# Patient Record
Sex: FEMALE | Race: WHITE | Employment: FULL TIME | URBAN - METROPOLITAN AREA
[De-identification: names, ages, dates, MRNs, and addresses within clinical notes are randomized per-mention and may not be internally consistent; named-entity substitution may affect disease eponyms.]

---

## 2023-06-24 ENCOUNTER — APPOINTMENT (OUTPATIENT)
Dept: CT IMAGING | Age: 48
DRG: 065 | End: 2023-06-24
Payer: OTHER GOVERNMENT

## 2023-06-24 ENCOUNTER — APPOINTMENT (OUTPATIENT)
Dept: MRI IMAGING | Age: 48
DRG: 065 | End: 2023-06-24
Payer: OTHER GOVERNMENT

## 2023-06-24 ENCOUNTER — HOSPITAL ENCOUNTER (INPATIENT)
Age: 48
LOS: 3 days | Discharge: HOME | DRG: 065 | End: 2023-06-27
Attending: EMERGENCY MEDICINE | Admitting: INTERNAL MEDICINE
Payer: OTHER GOVERNMENT

## 2023-06-24 DIAGNOSIS — G45.9 TIA (TRANSIENT ISCHEMIC ATTACK): Primary | ICD-10-CM

## 2023-06-24 DIAGNOSIS — N17.9 AKI (ACUTE KIDNEY INJURY) (HCC): ICD-10-CM

## 2023-06-24 DIAGNOSIS — I63.9 ACUTE CEREBRAL INFARCTION (HCC): ICD-10-CM

## 2023-06-24 DIAGNOSIS — I10 UNCONTROLLED HYPERTENSION: ICD-10-CM

## 2023-06-24 PROBLEM — R20.0 LEFT SIDED NUMBNESS: Status: ACTIVE | Noted: 2023-06-24

## 2023-06-24 LAB
ALBUMIN SERPL-MCNC: 4.1 G/DL (ref 3.5–5.2)
ALP SERPL-CCNC: 91 U/L (ref 35–104)
ALT SERPL-CCNC: 12 U/L (ref 5–33)
AMPHET UR QL SCN: NEGATIVE
ANION GAP SERPL CALCULATED.3IONS-SCNC: 12 MMOL/L (ref 9–17)
AST SERPL-CCNC: 17 U/L
BARBITURATES UR QL SCN: NEGATIVE
BASOPHILS # BLD: 0.04 K/UL (ref 0–0.2)
BASOPHILS NFR BLD: 1 % (ref 0–2)
BENZODIAZ UR QL: NEGATIVE
BILIRUB SERPL-MCNC: 0.4 MG/DL (ref 0.3–1.2)
BUN SERPL-MCNC: 14 MG/DL (ref 6–20)
CALCIUM SERPL-MCNC: 9.1 MG/DL (ref 8.6–10.4)
CANNABINOIDS UR QL SCN: NEGATIVE
CHLORIDE SERPL-SCNC: 106 MMOL/L (ref 98–107)
CHOLEST SERPL-MCNC: 191 MG/DL
CHOLESTEROL/HDL RATIO: 5.6
CO2 SERPL-SCNC: 23 MMOL/L (ref 20–31)
COCAINE UR QL SCN: NEGATIVE
CREAT SERPL-MCNC: 0.78 MG/DL (ref 0.5–0.9)
EOSINOPHIL # BLD: 0.26 K/UL (ref 0–0.4)
EOSINOPHILS RELATIVE PERCENT: 6 % (ref 0–4)
ERYTHROCYTE [DISTWIDTH] IN BLOOD BY AUTOMATED COUNT: 17.2 % (ref 11.5–14.9)
ERYTHROCYTE [SEDIMENTATION RATE] IN BLOOD BY WESTERGREN METHOD: 10 MM/HR (ref 0–20)
FENTANYL UR QL: NEGATIVE
FERRITIN SERPL-MCNC: 5 NG/ML (ref 13–150)
GFR SERPL CREATININE-BSD FRML MDRD: >60 ML/MIN/1.73M2
GLUCOSE SERPL-MCNC: 114 MG/DL (ref 70–99)
HCG SERPL QL: NEGATIVE
HCT VFR BLD AUTO: 30.6 % (ref 36–46)
HCYS SERPL-SCNC: 18.6 UMOL/L
HDLC SERPL-MCNC: 34 MG/DL
HGB BLD-MCNC: 9 G/DL (ref 12–16)
IRON SATN MFR SERPL: 5 % (ref 20–55)
IRON SERPL-MCNC: 20 UG/DL (ref 37–145)
LDLC SERPL CALC-MCNC: 114 MG/DL (ref 0–130)
LYMPHOCYTES # BLD: 26 % (ref 24–44)
LYMPHOCYTES NFR BLD: 1.14 K/UL (ref 1–4.8)
MAGNESIUM SERPL-MCNC: 2.2 MG/DL (ref 1.6–2.6)
MCH RBC QN AUTO: 18 PG (ref 26–34)
MCHC RBC AUTO-ENTMCNC: 29.5 G/DL (ref 31–37)
MCV RBC AUTO: 60.9 FL (ref 80–100)
METHADONE UR QL: NEGATIVE
MONOCYTES NFR BLD: 0.22 K/UL (ref 0.1–1.3)
MONOCYTES NFR BLD: 5 % (ref 1–7)
MORPHOLOGY: ABNORMAL
NEUTROPHILS NFR BLD: 62 % (ref 36–66)
NEUTS SEG NFR BLD: 2.74 K/UL (ref 1.3–9.1)
OPIATES UR QL SCN: NEGATIVE
OXYCODONE UR QL SCN: NEGATIVE
PCP UR QL SCN: NEGATIVE
PLATELET # BLD AUTO: 320 K/UL (ref 150–450)
PMV BLD AUTO: 6.4 FL (ref 6–12)
POTASSIUM SERPL-SCNC: 3.8 MMOL/L (ref 3.7–5.3)
PROT SERPL-MCNC: 7.1 G/DL (ref 6.4–8.3)
RBC # BLD AUTO: 5.03 M/UL (ref 4–5.2)
RETICS # AUTO: 0.06 M/UL (ref 0.02–0.1)
RETICS/RBC NFR AUTO: 1.3 % (ref 0.5–2)
SODIUM SERPL-SCNC: 141 MMOL/L (ref 135–144)
T4 FREE SERPL-MCNC: 0.6 NG/DL (ref 0.9–1.7)
TEST INFORMATION: NORMAL
TIBC SERPL-MCNC: 383 UG/DL (ref 250–450)
TRIGL SERPL-MCNC: 215 MG/DL
TROPONIN I SERPL HS-MCNC: 8 NG/L (ref 0–14)
TSH SERPL DL<=0.05 MIU/L-ACNC: 29.21 UIU/ML (ref 0.3–5)
UNSATURATED IRON BINDING CAPACITY: 363 UG/DL (ref 112–347)
WBC OTHER # BLD: 4.4 K/UL (ref 3.5–11)

## 2023-06-24 PROCEDURE — 83090 ASSAY OF HOMOCYSTEINE: CPT

## 2023-06-24 PROCEDURE — 83036 HEMOGLOBIN GLYCOSYLATED A1C: CPT

## 2023-06-24 PROCEDURE — 6370000000 HC RX 637 (ALT 250 FOR IP)

## 2023-06-24 PROCEDURE — 83550 IRON BINDING TEST: CPT

## 2023-06-24 PROCEDURE — 86225 DNA ANTIBODY NATIVE: CPT

## 2023-06-24 PROCEDURE — 99223 1ST HOSP IP/OBS HIGH 75: CPT | Performed by: PSYCHIATRY & NEUROLOGY

## 2023-06-24 PROCEDURE — 84703 CHORIONIC GONADOTROPIN ASSAY: CPT

## 2023-06-24 PROCEDURE — 36415 COLL VENOUS BLD VENIPUNCTURE: CPT

## 2023-06-24 PROCEDURE — 70498 CT ANGIOGRAPHY NECK: CPT

## 2023-06-24 PROCEDURE — 99223 1ST HOSP IP/OBS HIGH 75: CPT | Performed by: INTERNAL MEDICINE

## 2023-06-24 PROCEDURE — 96374 THER/PROPH/DIAG INJ IV PUSH: CPT

## 2023-06-24 PROCEDURE — 70551 MRI BRAIN STEM W/O DYE: CPT

## 2023-06-24 PROCEDURE — 6370000000 HC RX 637 (ALT 250 FOR IP): Performed by: NURSE PRACTITIONER

## 2023-06-24 PROCEDURE — 85045 AUTOMATED RETICULOCYTE COUNT: CPT

## 2023-06-24 PROCEDURE — 80061 LIPID PANEL: CPT

## 2023-06-24 PROCEDURE — 82728 ASSAY OF FERRITIN: CPT

## 2023-06-24 PROCEDURE — 85610 PROTHROMBIN TIME: CPT

## 2023-06-24 PROCEDURE — 85027 COMPLETE CBC AUTOMATED: CPT

## 2023-06-24 PROCEDURE — 85613 RUSSELL VIPER VENOM DILUTED: CPT

## 2023-06-24 PROCEDURE — 99285 EMERGENCY DEPT VISIT HI MDM: CPT

## 2023-06-24 PROCEDURE — 83735 ASSAY OF MAGNESIUM: CPT

## 2023-06-24 PROCEDURE — 70450 CT HEAD/BRAIN W/O DYE: CPT

## 2023-06-24 PROCEDURE — 6370000000 HC RX 637 (ALT 250 FOR IP): Performed by: EMERGENCY MEDICINE

## 2023-06-24 PROCEDURE — 86147 CARDIOLIPIN ANTIBODY EA IG: CPT

## 2023-06-24 PROCEDURE — 85652 RBC SED RATE AUTOMATED: CPT

## 2023-06-24 PROCEDURE — 6360000002 HC RX W HCPCS

## 2023-06-24 PROCEDURE — 92610 EVALUATE SWALLOWING FUNCTION: CPT

## 2023-06-24 PROCEDURE — 93005 ELECTROCARDIOGRAM TRACING: CPT | Performed by: EMERGENCY MEDICINE

## 2023-06-24 PROCEDURE — 2580000003 HC RX 258

## 2023-06-24 PROCEDURE — 6360000004 HC RX CONTRAST MEDICATION: Performed by: EMERGENCY MEDICINE

## 2023-06-24 PROCEDURE — 83540 ASSAY OF IRON: CPT

## 2023-06-24 PROCEDURE — 84484 ASSAY OF TROPONIN QUANT: CPT

## 2023-06-24 PROCEDURE — 80053 COMPREHEN METABOLIC PANEL: CPT

## 2023-06-24 PROCEDURE — 85730 THROMBOPLASTIN TIME PARTIAL: CPT

## 2023-06-24 PROCEDURE — 84439 ASSAY OF FREE THYROXINE: CPT

## 2023-06-24 PROCEDURE — 80307 DRUG TEST PRSMV CHEM ANLYZR: CPT

## 2023-06-24 PROCEDURE — 2580000003 HC RX 258: Performed by: EMERGENCY MEDICINE

## 2023-06-24 PROCEDURE — 6360000002 HC RX W HCPCS: Performed by: EMERGENCY MEDICINE

## 2023-06-24 PROCEDURE — 6370000000 HC RX 637 (ALT 250 FOR IP): Performed by: PSYCHIATRY & NEUROLOGY

## 2023-06-24 PROCEDURE — 84443 ASSAY THYROID STIM HORMONE: CPT

## 2023-06-24 PROCEDURE — 86038 ANTINUCLEAR ANTIBODIES: CPT

## 2023-06-24 PROCEDURE — 2060000000 HC ICU INTERMEDIATE R&B

## 2023-06-24 RX ORDER — SODIUM CHLORIDE 0.9 % (FLUSH) 0.9 %
5-40 SYRINGE (ML) INJECTION EVERY 12 HOURS SCHEDULED
Status: DISCONTINUED | OUTPATIENT
Start: 2023-06-24 | End: 2023-06-27 | Stop reason: HOSPADM

## 2023-06-24 RX ORDER — ONDANSETRON 2 MG/ML
8 INJECTION INTRAMUSCULAR; INTRAVENOUS ONCE
Status: COMPLETED | OUTPATIENT
Start: 2023-06-24 | End: 2023-06-24

## 2023-06-24 RX ORDER — BUTALBITAL, ACETAMINOPHEN AND CAFFEINE 300; 40; 50 MG/1; MG/1; MG/1
1 CAPSULE ORAL EVERY 6 HOURS PRN
Status: DISCONTINUED | OUTPATIENT
Start: 2023-06-24 | End: 2023-06-27 | Stop reason: HOSPADM

## 2023-06-24 RX ORDER — ATORVASTATIN CALCIUM 40 MG/1
40 TABLET, FILM COATED ORAL NIGHTLY
Status: DISCONTINUED | OUTPATIENT
Start: 2023-06-24 | End: 2023-06-24

## 2023-06-24 RX ORDER — ATORVASTATIN CALCIUM 80 MG/1
80 TABLET, FILM COATED ORAL NIGHTLY
Status: DISCONTINUED | OUTPATIENT
Start: 2023-06-24 | End: 2023-06-27 | Stop reason: HOSPADM

## 2023-06-24 RX ORDER — POLYETHYLENE GLYCOL 3350 17 G/17G
17 POWDER, FOR SOLUTION ORAL DAILY PRN
Status: DISCONTINUED | OUTPATIENT
Start: 2023-06-24 | End: 2023-06-27 | Stop reason: HOSPADM

## 2023-06-24 RX ORDER — 0.9 % SODIUM CHLORIDE 0.9 %
80 INTRAVENOUS SOLUTION INTRAVENOUS ONCE
Status: COMPLETED | OUTPATIENT
Start: 2023-06-24 | End: 2023-06-24

## 2023-06-24 RX ORDER — LANOLIN ALCOHOL/MO/W.PET/CERES
3 CREAM (GRAM) TOPICAL NIGHTLY PRN
Status: DISCONTINUED | OUTPATIENT
Start: 2023-06-24 | End: 2023-06-27 | Stop reason: HOSPADM

## 2023-06-24 RX ORDER — LORAZEPAM 0.5 MG/1
0.5 TABLET ORAL
Status: COMPLETED | OUTPATIENT
Start: 2023-06-24 | End: 2023-06-24

## 2023-06-24 RX ORDER — IBUPROFEN 600 MG/1
600 TABLET ORAL ONCE
Status: COMPLETED | OUTPATIENT
Start: 2023-06-24 | End: 2023-06-24

## 2023-06-24 RX ORDER — SODIUM CHLORIDE 0.9 % (FLUSH) 0.9 %
5-40 SYRINGE (ML) INJECTION PRN
Status: DISCONTINUED | OUTPATIENT
Start: 2023-06-24 | End: 2023-06-27 | Stop reason: HOSPADM

## 2023-06-24 RX ORDER — ASPIRIN 325 MG
325 TABLET ORAL ONCE
Status: COMPLETED | OUTPATIENT
Start: 2023-06-24 | End: 2023-06-24

## 2023-06-24 RX ORDER — ONDANSETRON 4 MG/1
4 TABLET, ORALLY DISINTEGRATING ORAL EVERY 8 HOURS PRN
Status: DISCONTINUED | OUTPATIENT
Start: 2023-06-24 | End: 2023-06-27 | Stop reason: HOSPADM

## 2023-06-24 RX ORDER — ONDANSETRON 2 MG/ML
4 INJECTION INTRAMUSCULAR; INTRAVENOUS EVERY 6 HOURS PRN
Status: DISCONTINUED | OUTPATIENT
Start: 2023-06-24 | End: 2023-06-27 | Stop reason: HOSPADM

## 2023-06-24 RX ORDER — ACETAMINOPHEN 650 MG/1
650 SUPPOSITORY RECTAL EVERY 6 HOURS PRN
Status: DISCONTINUED | OUTPATIENT
Start: 2023-06-24 | End: 2023-06-27 | Stop reason: HOSPADM

## 2023-06-24 RX ORDER — LISINOPRIL 10 MG/1
10 TABLET ORAL DAILY
Status: DISCONTINUED | OUTPATIENT
Start: 2023-06-24 | End: 2023-06-27 | Stop reason: HOSPADM

## 2023-06-24 RX ORDER — ASPIRIN 81 MG/1
81 TABLET, CHEWABLE ORAL DAILY
Status: DISCONTINUED | OUTPATIENT
Start: 2023-06-25 | End: 2023-06-27 | Stop reason: HOSPADM

## 2023-06-24 RX ORDER — SODIUM CHLORIDE 0.9 % (FLUSH) 0.9 %
10 SYRINGE (ML) INJECTION PRN
Status: DISCONTINUED | OUTPATIENT
Start: 2023-06-24 | End: 2023-06-27 | Stop reason: HOSPADM

## 2023-06-24 RX ORDER — SODIUM CHLORIDE 9 MG/ML
INJECTION, SOLUTION INTRAVENOUS PRN
Status: DISCONTINUED | OUTPATIENT
Start: 2023-06-24 | End: 2023-06-27 | Stop reason: HOSPADM

## 2023-06-24 RX ORDER — ACETAMINOPHEN 325 MG/1
650 TABLET ORAL EVERY 6 HOURS PRN
Status: DISCONTINUED | OUTPATIENT
Start: 2023-06-24 | End: 2023-06-27 | Stop reason: HOSPADM

## 2023-06-24 RX ORDER — ENOXAPARIN SODIUM 100 MG/ML
40 INJECTION SUBCUTANEOUS DAILY
Status: DISCONTINUED | OUTPATIENT
Start: 2023-06-24 | End: 2023-06-27 | Stop reason: HOSPADM

## 2023-06-24 RX ORDER — LISINOPRIL 10 MG/1
TABLET ORAL DAILY
COMMUNITY

## 2023-06-24 RX ORDER — CLOPIDOGREL BISULFATE 75 MG/1
75 TABLET ORAL DAILY
Status: DISCONTINUED | OUTPATIENT
Start: 2023-06-25 | End: 2023-06-24

## 2023-06-24 RX ORDER — TOPIRAMATE 25 MG/1
25 TABLET ORAL
Status: DISCONTINUED | OUTPATIENT
Start: 2023-06-24 | End: 2023-06-27 | Stop reason: HOSPADM

## 2023-06-24 RX ADMIN — LISINOPRIL 10 MG: 10 TABLET ORAL at 10:34

## 2023-06-24 RX ADMIN — IBUPROFEN 600 MG: 600 TABLET, FILM COATED ORAL at 16:41

## 2023-06-24 RX ADMIN — TOPIRAMATE 25 MG: 25 TABLET, FILM COATED ORAL at 20:22

## 2023-06-24 RX ADMIN — IOPAMIDOL 75 ML: 755 INJECTION, SOLUTION INTRAVENOUS at 08:59

## 2023-06-24 RX ADMIN — Medication 3 MG: at 21:49

## 2023-06-24 RX ADMIN — ENOXAPARIN SODIUM 40 MG: 100 INJECTION SUBCUTANEOUS at 10:34

## 2023-06-24 RX ADMIN — SODIUM CHLORIDE, PRESERVATIVE FREE 10 ML: 5 INJECTION INTRAVENOUS at 09:04

## 2023-06-24 RX ADMIN — SODIUM CHLORIDE, PRESERVATIVE FREE 10 ML: 5 INJECTION INTRAVENOUS at 10:46

## 2023-06-24 RX ADMIN — ONDANSETRON 8 MG: 2 INJECTION INTRAMUSCULAR; INTRAVENOUS at 08:36

## 2023-06-24 RX ADMIN — BUTALBITA,ACETAMINOPHEN AND CAFFEINE 1 CAPSULE: 50; 300; 40 CAPSULE ORAL at 19:09

## 2023-06-24 RX ADMIN — ATORVASTATIN CALCIUM 80 MG: 80 TABLET, FILM COATED ORAL at 20:22

## 2023-06-24 RX ADMIN — SODIUM CHLORIDE, PRESERVATIVE FREE 10 ML: 5 INJECTION INTRAVENOUS at 20:22

## 2023-06-24 RX ADMIN — ONDANSETRON 4 MG: 2 INJECTION INTRAMUSCULAR; INTRAVENOUS at 16:42

## 2023-06-24 RX ADMIN — ACETAMINOPHEN 650 MG: 325 TABLET ORAL at 11:47

## 2023-06-24 RX ADMIN — LORAZEPAM 0.5 MG: 0.5 TABLET ORAL at 13:32

## 2023-06-24 RX ADMIN — SODIUM CHLORIDE 80 ML: 9 INJECTION, SOLUTION INTRAVENOUS at 09:04

## 2023-06-24 RX ADMIN — ASPIRIN 325 MG: 325 TABLET ORAL at 08:36

## 2023-06-24 ASSESSMENT — PAIN DESCRIPTION - LOCATION
LOCATION: ARM;CHEST
LOCATION: HEAD
LOCATION: HEAD

## 2023-06-24 ASSESSMENT — ENCOUNTER SYMPTOMS
BACK PAIN: 0
COUGH: 0
SHORTNESS OF BREATH: 0
CHEST TIGHTNESS: 1
ABDOMINAL PAIN: 0

## 2023-06-24 ASSESSMENT — PAIN DESCRIPTION - PAIN TYPE: TYPE: ACUTE PAIN

## 2023-06-24 ASSESSMENT — PAIN DESCRIPTION - DESCRIPTORS: DESCRIPTORS: THROBBING

## 2023-06-24 ASSESSMENT — PAIN SCALES - GENERAL
PAINLEVEL_OUTOF10: 10
PAINLEVEL_OUTOF10: 10
PAINLEVEL_OUTOF10: 7
PAINLEVEL_OUTOF10: 10

## 2023-06-24 ASSESSMENT — PAIN DESCRIPTION - ORIENTATION
ORIENTATION: LEFT
ORIENTATION: ANTERIOR

## 2023-06-24 ASSESSMENT — LIFESTYLE VARIABLES
HOW MANY STANDARD DRINKS CONTAINING ALCOHOL DO YOU HAVE ON A TYPICAL DAY: PATIENT DOES NOT DRINK
HOW OFTEN DO YOU HAVE A DRINK CONTAINING ALCOHOL: NEVER

## 2023-06-24 ASSESSMENT — PAIN - FUNCTIONAL ASSESSMENT
PAIN_FUNCTIONAL_ASSESSMENT: ACTIVITIES ARE NOT PREVENTED
PAIN_FUNCTIONAL_ASSESSMENT: 0-10

## 2023-06-25 LAB
ANION GAP SERPL CALCULATED.3IONS-SCNC: 10 MMOL/L (ref 9–17)
BASOPHILS # BLD: 0.05 K/UL (ref 0–0.2)
BASOPHILS NFR BLD: 1 % (ref 0–2)
BUN SERPL-MCNC: 15 MG/DL (ref 6–20)
CALCIUM SERPL-MCNC: 8.7 MG/DL (ref 8.6–10.4)
CHLORIDE SERPL-SCNC: 105 MMOL/L (ref 98–107)
CO2 SERPL-SCNC: 23 MMOL/L (ref 20–31)
CREAT SERPL-MCNC: 0.98 MG/DL (ref 0.5–0.9)
EOSINOPHIL # BLD: 0.26 K/UL (ref 0–0.4)
EOSINOPHILS RELATIVE PERCENT: 5 % (ref 0–4)
ERYTHROCYTE [DISTWIDTH] IN BLOOD BY AUTOMATED COUNT: 17.2 % (ref 11.5–14.9)
EST. AVERAGE GLUCOSE BLD GHB EST-MCNC: 117 MG/DL
FOLATE SERPL-MCNC: 4.7 NG/ML
GFR SERPL CREATININE-BSD FRML MDRD: >60 ML/MIN/1.73M2
GLUCOSE SERPL-MCNC: 100 MG/DL (ref 70–99)
HBA1C MFR BLD: 5.7 % (ref 4–6)
HCT VFR BLD AUTO: 29.7 % (ref 36–46)
HGB BLD-MCNC: 8.9 G/DL (ref 12–16)
LYMPHOCYTES # BLD: 28 % (ref 24–44)
LYMPHOCYTES NFR BLD: 1.46 K/UL (ref 1–4.8)
MAGNESIUM SERPL-MCNC: 2 MG/DL (ref 1.6–2.6)
MCH RBC QN AUTO: 19 PG (ref 26–34)
MCHC RBC AUTO-ENTMCNC: 30 G/DL (ref 31–37)
MCV RBC AUTO: 63.3 FL (ref 80–100)
MONOCYTES NFR BLD: 0.26 K/UL (ref 0.1–1.3)
MONOCYTES NFR BLD: 5 % (ref 1–7)
MORPHOLOGY: ABNORMAL
NEUTROPHILS NFR BLD: 61 % (ref 36–66)
NEUTS SEG NFR BLD: 3.17 K/UL (ref 1.3–9.1)
PLATELET # BLD AUTO: 296 K/UL (ref 150–450)
PMV BLD AUTO: 6.3 FL (ref 6–12)
POTASSIUM SERPL-SCNC: 3.4 MMOL/L (ref 3.7–5.3)
RBC # BLD AUTO: 4.69 M/UL (ref 4–5.2)
SODIUM SERPL-SCNC: 138 MMOL/L (ref 135–144)
VIT B12 SERPL-MCNC: 538 PG/ML (ref 232–1245)
WBC OTHER # BLD: 5.2 K/UL (ref 3.5–11)

## 2023-06-25 PROCEDURE — 97161 PT EVAL LOW COMPLEX 20 MIN: CPT

## 2023-06-25 PROCEDURE — 36415 COLL VENOUS BLD VENIPUNCTURE: CPT

## 2023-06-25 PROCEDURE — 99233 SBSQ HOSP IP/OBS HIGH 50: CPT | Performed by: INTERNAL MEDICINE

## 2023-06-25 PROCEDURE — 83735 ASSAY OF MAGNESIUM: CPT

## 2023-06-25 PROCEDURE — 93005 ELECTROCARDIOGRAM TRACING: CPT | Performed by: INTERNAL MEDICINE

## 2023-06-25 PROCEDURE — 6370000000 HC RX 637 (ALT 250 FOR IP)

## 2023-06-25 PROCEDURE — 97112 NEUROMUSCULAR REEDUCATION: CPT

## 2023-06-25 PROCEDURE — 6370000000 HC RX 637 (ALT 250 FOR IP): Performed by: PSYCHIATRY & NEUROLOGY

## 2023-06-25 PROCEDURE — 85027 COMPLETE CBC AUTOMATED: CPT

## 2023-06-25 PROCEDURE — 97166 OT EVAL MOD COMPLEX 45 MIN: CPT

## 2023-06-25 PROCEDURE — 6370000000 HC RX 637 (ALT 250 FOR IP): Performed by: NURSE PRACTITIONER

## 2023-06-25 PROCEDURE — 2580000003 HC RX 258

## 2023-06-25 PROCEDURE — 82746 ASSAY OF FOLIC ACID SERUM: CPT

## 2023-06-25 PROCEDURE — 82607 VITAMIN B-12: CPT

## 2023-06-25 PROCEDURE — 99232 SBSQ HOSP IP/OBS MODERATE 35: CPT | Performed by: PSYCHIATRY & NEUROLOGY

## 2023-06-25 PROCEDURE — 80048 BASIC METABOLIC PNL TOTAL CA: CPT

## 2023-06-25 PROCEDURE — 83921 ORGANIC ACID SINGLE QUANT: CPT

## 2023-06-25 PROCEDURE — 6360000002 HC RX W HCPCS

## 2023-06-25 PROCEDURE — 2060000000 HC ICU INTERMEDIATE R&B

## 2023-06-25 PROCEDURE — 99254 IP/OBS CNSLTJ NEW/EST MOD 60: CPT | Performed by: INTERNAL MEDICINE

## 2023-06-25 RX ORDER — POTASSIUM CHLORIDE 7.45 MG/ML
10 INJECTION INTRAVENOUS PRN
Status: DISCONTINUED | OUTPATIENT
Start: 2023-06-25 | End: 2023-06-27 | Stop reason: HOSPADM

## 2023-06-25 RX ORDER — LEVOTHYROXINE SODIUM 0.05 MG/1
50 TABLET ORAL DAILY
Status: DISCONTINUED | OUTPATIENT
Start: 2023-06-25 | End: 2023-06-27 | Stop reason: HOSPADM

## 2023-06-25 RX ORDER — FOLIC ACID 1 MG/1
1 TABLET ORAL 2 TIMES DAILY
Status: DISCONTINUED | OUTPATIENT
Start: 2023-06-25 | End: 2023-06-27 | Stop reason: HOSPADM

## 2023-06-25 RX ORDER — POTASSIUM CHLORIDE 20 MEQ/1
40 TABLET, EXTENDED RELEASE ORAL PRN
Status: DISCONTINUED | OUTPATIENT
Start: 2023-06-25 | End: 2023-06-27 | Stop reason: HOSPADM

## 2023-06-25 RX ADMIN — TOPIRAMATE 25 MG: 25 TABLET, FILM COATED ORAL at 20:53

## 2023-06-25 RX ADMIN — ASPIRIN 81 MG: 81 TABLET, CHEWABLE ORAL at 09:59

## 2023-06-25 RX ADMIN — ENOXAPARIN SODIUM 40 MG: 100 INJECTION SUBCUTANEOUS at 09:59

## 2023-06-25 RX ADMIN — LEVOTHYROXINE SODIUM 50 MCG: 0.05 TABLET ORAL at 10:10

## 2023-06-25 RX ADMIN — POTASSIUM BICARBONATE 40 MEQ: 782 TABLET, EFFERVESCENT ORAL at 15:29

## 2023-06-25 RX ADMIN — ATORVASTATIN CALCIUM 80 MG: 80 TABLET, FILM COATED ORAL at 20:53

## 2023-06-25 RX ADMIN — SODIUM CHLORIDE, PRESERVATIVE FREE 10 ML: 5 INJECTION INTRAVENOUS at 20:53

## 2023-06-25 RX ADMIN — Medication 3 MG: at 20:53

## 2023-06-25 RX ADMIN — SODIUM CHLORIDE, PRESERVATIVE FREE 10 ML: 5 INJECTION INTRAVENOUS at 10:03

## 2023-06-25 RX ADMIN — POLYETHYLENE GLYCOL 3350 17 G: 17 POWDER, FOR SOLUTION ORAL at 15:29

## 2023-06-25 RX ADMIN — BUTALBITA,ACETAMINOPHEN AND CAFFEINE 1 CAPSULE: 50; 300; 40 CAPSULE ORAL at 02:09

## 2023-06-25 RX ADMIN — FOLIC ACID 1 MG: 1 TABLET ORAL at 20:53

## 2023-06-25 ASSESSMENT — ENCOUNTER SYMPTOMS
SORE THROAT: 0
NAUSEA: 0
SHORTNESS OF BREATH: 0
RHINORRHEA: 0
COUGH: 0
VOMITING: 0
ABDOMINAL PAIN: 0

## 2023-06-25 ASSESSMENT — PAIN SCALES - GENERAL: PAINLEVEL_OUTOF10: 7

## 2023-06-26 ENCOUNTER — HOSPITAL ENCOUNTER (OUTPATIENT)
Dept: CARDIAC CATH/INVASIVE PROCEDURES | Age: 48
Discharge: HOME | End: 2023-06-26
Payer: OTHER GOVERNMENT

## 2023-06-26 VITALS
SYSTOLIC BLOOD PRESSURE: 148 MMHG | TEMPERATURE: 97.7 F | HEART RATE: 72 BPM | OXYGEN SATURATION: 99 % | RESPIRATION RATE: 12 BRPM | DIASTOLIC BLOOD PRESSURE: 88 MMHG

## 2023-06-26 PROBLEM — I21.4 NON Q WAVE MYOCARDIAL INFARCTION (HCC): Status: ACTIVE | Noted: 2023-06-26

## 2023-06-26 PROBLEM — I63.00 CEREBROVASCULAR ACCIDENT (CVA) DUE TO THROMBOSIS OF PRECEREBRAL ARTERY (HCC): Status: ACTIVE | Noted: 2023-06-26

## 2023-06-26 LAB
ANION GAP SERPL CALCULATED.3IONS-SCNC: 11 MMOL/L (ref 9–17)
BASOPHILS # BLD: 0.05 K/UL (ref 0–0.2)
BASOPHILS NFR BLD: 1 % (ref 0–2)
BUN SERPL-MCNC: 14 MG/DL (ref 6–20)
CALCIUM SERPL-MCNC: 8.7 MG/DL (ref 8.6–10.4)
CHLORIDE SERPL-SCNC: 104 MMOL/L (ref 98–107)
CO2 SERPL-SCNC: 23 MMOL/L (ref 20–31)
CREAT SERPL-MCNC: 0.97 MG/DL (ref 0.5–0.9)
EKG ATRIAL RATE: 60 BPM
EKG ATRIAL RATE: 72 BPM
EKG P AXIS: 40 DEGREES
EKG P AXIS: 61 DEGREES
EKG P-R INTERVAL: 148 MS
EKG P-R INTERVAL: 156 MS
EKG Q-T INTERVAL: 404 MS
EKG Q-T INTERVAL: 420 MS
EKG QRS DURATION: 78 MS
EKG QRS DURATION: 78 MS
EKG QTC CALCULATION (BAZETT): 420 MS
EKG QTC CALCULATION (BAZETT): 442 MS
EKG R AXIS: 35 DEGREES
EKG R AXIS: 42 DEGREES
EKG T AXIS: 33 DEGREES
EKG T AXIS: 54 DEGREES
EKG VENTRICULAR RATE: 60 BPM
EKG VENTRICULAR RATE: 72 BPM
EOSINOPHIL # BLD: 0.24 K/UL (ref 0–0.4)
EOSINOPHILS RELATIVE PERCENT: 5 % (ref 0–4)
ERYTHROCYTE [DISTWIDTH] IN BLOOD BY AUTOMATED COUNT: 17 % (ref 11.5–14.9)
GFR SERPL CREATININE-BSD FRML MDRD: >60 ML/MIN/1.73M2
GLUCOSE SERPL-MCNC: 107 MG/DL (ref 70–99)
HCT VFR BLD AUTO: 27.1 % (ref 36–46)
HGB BLD-MCNC: 8.3 G/DL (ref 12–16)
LYMPHOCYTES # BLD: 29 % (ref 24–44)
LYMPHOCYTES NFR BLD: 1.36 K/UL (ref 1–4.8)
MCH RBC QN AUTO: 18.9 PG (ref 26–34)
MCHC RBC AUTO-ENTMCNC: 30.6 G/DL (ref 31–37)
MCV RBC AUTO: 61.6 FL (ref 80–100)
MONOCYTES NFR BLD: 0.24 K/UL (ref 0.1–1.3)
MONOCYTES NFR BLD: 5 % (ref 1–7)
MORPHOLOGY: ABNORMAL
MORPHOLOGY: ABNORMAL
NEUTROPHILS NFR BLD: 60 % (ref 36–66)
NEUTS SEG NFR BLD: 2.81 K/UL (ref 1.3–9.1)
PATH REV BLD -IMP: NORMAL
PLATELET # BLD AUTO: 259 K/UL (ref 150–450)
PMV BLD AUTO: 6.4 FL (ref 6–12)
POTASSIUM SERPL-SCNC: 3.8 MMOL/L (ref 3.7–5.3)
RBC # BLD AUTO: 4.39 M/UL (ref 4–5.2)
SODIUM SERPL-SCNC: 138 MMOL/L (ref 135–144)
SURGICAL PATHOLOGY REPORT: NORMAL
WBC OTHER # BLD: 4.7 K/UL (ref 3.5–11)

## 2023-06-26 PROCEDURE — 6370000000 HC RX 637 (ALT 250 FOR IP)

## 2023-06-26 PROCEDURE — 7100000011 HC PHASE II RECOVERY - ADDTL 15 MIN

## 2023-06-26 PROCEDURE — 6360000002 HC RX W HCPCS

## 2023-06-26 PROCEDURE — 93455 CORONARY ART/GRFT ANGIO S&I: CPT | Performed by: INTERNAL MEDICINE

## 2023-06-26 PROCEDURE — 93325 DOPPLER ECHO COLOR FLOW MAPG: CPT

## 2023-06-26 PROCEDURE — 92928 PRQ TCAT PLMT NTRAC ST 1 LES: CPT | Performed by: INTERNAL MEDICINE

## 2023-06-26 PROCEDURE — 85027 COMPLETE CBC AUTOMATED: CPT

## 2023-06-26 PROCEDURE — 99152 MOD SED SAME PHYS/QHP 5/>YRS: CPT

## 2023-06-26 PROCEDURE — 7100000010 HC PHASE II RECOVERY - FIRST 15 MIN

## 2023-06-26 PROCEDURE — 6370000000 HC RX 637 (ALT 250 FOR IP): Performed by: PSYCHIATRY & NEUROLOGY

## 2023-06-26 PROCEDURE — 36415 COLL VENOUS BLD VENIPUNCTURE: CPT

## 2023-06-26 PROCEDURE — 93010 ELECTROCARDIOGRAM REPORT: CPT | Performed by: INTERNAL MEDICINE

## 2023-06-26 PROCEDURE — 2060000000 HC ICU INTERMEDIATE R&B

## 2023-06-26 PROCEDURE — 93306 TTE W/DOPPLER COMPLETE: CPT | Performed by: INTERNAL MEDICINE

## 2023-06-26 PROCEDURE — C1764 EVENT RECORDER, CARDIAC: HCPCS

## 2023-06-26 PROCEDURE — 2709999900 HC NON-CHARGEABLE SUPPLY

## 2023-06-26 PROCEDURE — 6370000000 HC RX 637 (ALT 250 FOR IP): Performed by: NURSE PRACTITIONER

## 2023-06-26 PROCEDURE — 33285 INSJ SUBQ CAR RHYTHM MNTR: CPT

## 2023-06-26 PROCEDURE — 80048 BASIC METABOLIC PNL TOTAL CA: CPT

## 2023-06-26 PROCEDURE — 93312 ECHO TRANSESOPHAGEAL: CPT

## 2023-06-26 PROCEDURE — 99233 SBSQ HOSP IP/OBS HIGH 50: CPT | Performed by: INTERNAL MEDICINE

## 2023-06-26 PROCEDURE — 33285 INSJ SUBQ CAR RHYTHM MNTR: CPT | Performed by: INTERNAL MEDICINE

## 2023-06-26 PROCEDURE — 2580000003 HC RX 258

## 2023-06-26 PROCEDURE — 93312 ECHO TRANSESOPHAGEAL: CPT | Performed by: INTERNAL MEDICINE

## 2023-06-26 RX ORDER — HYDROCODONE BITARTRATE AND ACETAMINOPHEN 5; 325 MG/1; MG/1
1 TABLET ORAL ONCE
Status: COMPLETED | OUTPATIENT
Start: 2023-06-26 | End: 2023-06-26

## 2023-06-26 RX ADMIN — SODIUM CHLORIDE, PRESERVATIVE FREE 10 ML: 5 INJECTION INTRAVENOUS at 07:58

## 2023-06-26 RX ADMIN — BUTALBITA,ACETAMINOPHEN AND CAFFEINE 1 CAPSULE: 50; 300; 40 CAPSULE ORAL at 03:35

## 2023-06-26 RX ADMIN — FOLIC ACID 1 MG: 1 TABLET ORAL at 21:13

## 2023-06-26 RX ADMIN — LEVOTHYROXINE SODIUM 50 MCG: 0.05 TABLET ORAL at 07:57

## 2023-06-26 RX ADMIN — HYDROCODONE BITARTRATE AND ACETAMINOPHEN 1 TABLET: 5; 325 TABLET ORAL at 21:12

## 2023-06-26 RX ADMIN — ASPIRIN 81 MG: 81 TABLET, CHEWABLE ORAL at 07:57

## 2023-06-26 RX ADMIN — ATORVASTATIN CALCIUM 80 MG: 80 TABLET, FILM COATED ORAL at 21:12

## 2023-06-26 RX ADMIN — IRON SUCROSE 200 MG: 20 INJECTION, SOLUTION INTRAVENOUS at 08:43

## 2023-06-26 RX ADMIN — FOLIC ACID 1 MG: 1 TABLET ORAL at 07:57

## 2023-06-26 RX ADMIN — TOPIRAMATE 25 MG: 25 TABLET, FILM COATED ORAL at 21:12

## 2023-06-26 RX ADMIN — SODIUM CHLORIDE, PRESERVATIVE FREE 10 ML: 5 INJECTION INTRAVENOUS at 21:13

## 2023-06-26 RX ADMIN — ONDANSETRON 4 MG: 2 INJECTION INTRAMUSCULAR; INTRAVENOUS at 03:35

## 2023-06-26 ASSESSMENT — PAIN DESCRIPTION - LOCATION: LOCATION: CHEST

## 2023-06-26 ASSESSMENT — PAIN SCALES - GENERAL
PAINLEVEL_OUTOF10: 8
PAINLEVEL_OUTOF10: 8

## 2023-06-26 ASSESSMENT — PAIN DESCRIPTION - ORIENTATION: ORIENTATION: LEFT

## 2023-06-26 ASSESSMENT — PAIN DESCRIPTION - DESCRIPTORS: DESCRIPTORS: SHARP;PENETRATING

## 2023-06-27 ENCOUNTER — APPOINTMENT (OUTPATIENT)
Dept: NON INVASIVE DIAGNOSTICS | Age: 48
DRG: 065 | End: 2023-06-27
Payer: OTHER GOVERNMENT

## 2023-06-27 VITALS
BODY MASS INDEX: 28.34 KG/M2 | HEART RATE: 65 BPM | SYSTOLIC BLOOD PRESSURE: 132 MMHG | DIASTOLIC BLOOD PRESSURE: 77 MMHG | WEIGHT: 166.01 LBS | HEIGHT: 64 IN | RESPIRATION RATE: 16 BRPM | TEMPERATURE: 98.5 F | OXYGEN SATURATION: 100 %

## 2023-06-27 LAB
ANA SER QL IA: NEGATIVE
ANION GAP SERPL CALCULATED.3IONS-SCNC: 11 MMOL/L (ref 9–17)
ANION GAP SERPL CALCULATED.3IONS-SCNC: 9 MMOL/L (ref 9–17)
BASOPHILS # BLD: 0.04 K/UL (ref 0–0.2)
BASOPHILS NFR BLD: 1 % (ref 0–2)
BUN SERPL-MCNC: 13 MG/DL (ref 6–20)
BUN SERPL-MCNC: 13 MG/DL (ref 6–20)
CALCIUM SERPL-MCNC: 8.4 MG/DL (ref 8.6–10.4)
CALCIUM SERPL-MCNC: 8.7 MG/DL (ref 8.6–10.4)
CARDIOLIPIN IGA SER IA-ACNC: 1.7 APL (ref 0–14)
CARDIOLIPIN IGG SER IA-ACNC: 2.6 GPL (ref 0–10)
CARDIOLIPIN IGM SER IA-ACNC: 2.4 MPL (ref 0–10)
CHLORIDE SERPL-SCNC: 104 MMOL/L (ref 98–107)
CHLORIDE SERPL-SCNC: 107 MMOL/L (ref 98–107)
CO2 SERPL-SCNC: 22 MMOL/L (ref 20–31)
CO2 SERPL-SCNC: 24 MMOL/L (ref 20–31)
CREAT SERPL-MCNC: 1.02 MG/DL (ref 0.5–0.9)
CREAT SERPL-MCNC: 1.13 MG/DL (ref 0.5–0.9)
DILUTE RUSSELL VIPER VENOM TIME: NORMAL
DSDNA IGG SER QL IA: 2.7 IU/ML
EOSINOPHIL # BLD: 0.31 K/UL (ref 0–0.4)
EOSINOPHILS RELATIVE PERCENT: 7 % (ref 0–4)
ERYTHROCYTE [DISTWIDTH] IN BLOOD BY AUTOMATED COUNT: 16.9 % (ref 11.5–14.9)
GFR SERPL CREATININE-BSD FRML MDRD: >60 ML/MIN/1.73M2
GFR SERPL CREATININE-BSD FRML MDRD: >60 ML/MIN/1.73M2
GLUCOSE SERPL-MCNC: 109 MG/DL (ref 70–99)
GLUCOSE SERPL-MCNC: 109 MG/DL (ref 70–99)
HCT VFR BLD AUTO: 27 % (ref 36–46)
HGB BLD-MCNC: 8.4 G/DL (ref 12–16)
INR PPP: 1
LUPUS ANTICOAG: NORMAL
LV EF: 55 %
LVEF MODALITY: NORMAL
LYMPHOCYTES # BLD: 25 % (ref 24–44)
LYMPHOCYTES NFR BLD: 1.1 K/UL (ref 1–4.8)
MCH RBC QN AUTO: 18.8 PG (ref 26–34)
MCHC RBC AUTO-ENTMCNC: 31 G/DL (ref 31–37)
MCV RBC AUTO: 60.7 FL (ref 80–100)
MONOCYTES NFR BLD: 0.22 K/UL (ref 0.1–1.3)
MONOCYTES NFR BLD: 5 % (ref 1–7)
MORPHOLOGY: ABNORMAL
NEUTROPHILS NFR BLD: 62 % (ref 36–66)
NEUTS SEG NFR BLD: 2.73 K/UL (ref 1.3–9.1)
NUCLEAR IGG SER IA-RTO: <0.1 U/ML
PARTIAL THROMBOPLASTIN TIME: 35.6 SEC (ref 24–36)
PLATELET # BLD AUTO: 275 K/UL (ref 150–450)
PMV BLD AUTO: 6.4 FL (ref 6–12)
POTASSIUM SERPL-SCNC: 3.9 MMOL/L (ref 3.7–5.3)
POTASSIUM SERPL-SCNC: 4.1 MMOL/L (ref 3.7–5.3)
PROTHROMBIN TIME: 13.1 SEC (ref 11.8–14.6)
RBC # BLD AUTO: 4.45 M/UL (ref 4–5.2)
SODIUM SERPL-SCNC: 137 MMOL/L (ref 135–144)
SODIUM SERPL-SCNC: 140 MMOL/L (ref 135–144)
WBC OTHER # BLD: 4.4 K/UL (ref 3.5–11)

## 2023-06-27 PROCEDURE — 92523 SPEECH SOUND LANG COMPREHEN: CPT

## 2023-06-27 PROCEDURE — 85027 COMPLETE CBC AUTOMATED: CPT

## 2023-06-27 PROCEDURE — 6360000002 HC RX W HCPCS

## 2023-06-27 PROCEDURE — 99255 IP/OBS CONSLTJ NEW/EST HI 80: CPT | Performed by: INTERNAL MEDICINE

## 2023-06-27 PROCEDURE — 6370000000 HC RX 637 (ALT 250 FOR IP): Performed by: PSYCHIATRY & NEUROLOGY

## 2023-06-27 PROCEDURE — 2580000003 HC RX 258

## 2023-06-27 PROCEDURE — 97530 THERAPEUTIC ACTIVITIES: CPT

## 2023-06-27 PROCEDURE — 99233 SBSQ HOSP IP/OBS HIGH 50: CPT | Performed by: NURSE PRACTITIONER

## 2023-06-27 PROCEDURE — 6370000000 HC RX 637 (ALT 250 FOR IP)

## 2023-06-27 PROCEDURE — 36415 COLL VENOUS BLD VENIPUNCTURE: CPT

## 2023-06-27 PROCEDURE — 80048 BASIC METABOLIC PNL TOTAL CA: CPT

## 2023-06-27 PROCEDURE — 6370000000 HC RX 637 (ALT 250 FOR IP): Performed by: NURSE PRACTITIONER

## 2023-06-27 PROCEDURE — 99239 HOSP IP/OBS DSCHRG MGMT >30: CPT | Performed by: INTERNAL MEDICINE

## 2023-06-27 PROCEDURE — 93306 TTE W/DOPPLER COMPLETE: CPT

## 2023-06-27 PROCEDURE — 99232 SBSQ HOSP IP/OBS MODERATE 35: CPT | Performed by: PSYCHIATRY & NEUROLOGY

## 2023-06-27 RX ORDER — FOLIC ACID 1 MG/1
1 TABLET ORAL 2 TIMES DAILY
Qty: 30 TABLET | Refills: 0 | Status: SHIPPED | OUTPATIENT
Start: 2023-06-27

## 2023-06-27 RX ORDER — SODIUM CHLORIDE 9 MG/ML
INJECTION, SOLUTION INTRAVENOUS CONTINUOUS
Status: DISCONTINUED | OUTPATIENT
Start: 2023-06-27 | End: 2023-06-27 | Stop reason: HOSPADM

## 2023-06-27 RX ORDER — ATORVASTATIN CALCIUM 80 MG/1
80 TABLET, FILM COATED ORAL NIGHTLY
Qty: 30 TABLET | Refills: 0 | Status: SHIPPED | OUTPATIENT
Start: 2023-06-27

## 2023-06-27 RX ORDER — 0.9 % SODIUM CHLORIDE 0.9 %
500 INTRAVENOUS SOLUTION INTRAVENOUS ONCE
Status: COMPLETED | OUTPATIENT
Start: 2023-06-27 | End: 2023-06-27

## 2023-06-27 RX ORDER — LEVOTHYROXINE SODIUM 0.05 MG/1
50 TABLET ORAL DAILY
Qty: 30 TABLET | Refills: 0 | Status: SHIPPED | OUTPATIENT
Start: 2023-06-28

## 2023-06-27 RX ORDER — ATORVASTATIN CALCIUM 80 MG/1
80 TABLET, FILM COATED ORAL NIGHTLY
Qty: 30 TABLET | Refills: 0 | Status: SHIPPED | OUTPATIENT
Start: 2023-06-27 | End: 2023-06-27 | Stop reason: SDUPTHER

## 2023-06-27 RX ORDER — LEVOTHYROXINE SODIUM 0.05 MG/1
50 TABLET ORAL DAILY
Qty: 30 TABLET | Refills: 0 | Status: SHIPPED | OUTPATIENT
Start: 2023-06-28 | End: 2023-06-27 | Stop reason: SDUPTHER

## 2023-06-27 RX ORDER — ASPIRIN 81 MG/1
81 TABLET, CHEWABLE ORAL DAILY
Qty: 30 TABLET | Refills: 0 | Status: SHIPPED | OUTPATIENT
Start: 2023-06-28

## 2023-06-27 RX ORDER — HYDROCODONE BITARTRATE AND ACETAMINOPHEN 5; 325 MG/1; MG/1
1 TABLET ORAL ONCE
Status: COMPLETED | OUTPATIENT
Start: 2023-06-27 | End: 2023-06-27

## 2023-06-27 RX ORDER — FOLIC ACID 1 MG/1
1 TABLET ORAL 2 TIMES DAILY
Qty: 30 TABLET | Refills: 0 | Status: SHIPPED | OUTPATIENT
Start: 2023-06-27 | End: 2023-06-27 | Stop reason: SDUPTHER

## 2023-06-27 RX ORDER — ASPIRIN 81 MG/1
81 TABLET, CHEWABLE ORAL DAILY
Qty: 30 TABLET | Refills: 0 | Status: SHIPPED | OUTPATIENT
Start: 2023-06-28 | End: 2023-06-27 | Stop reason: SDUPTHER

## 2023-06-27 RX ADMIN — IRON SUCROSE 200 MG: 20 INJECTION, SOLUTION INTRAVENOUS at 09:47

## 2023-06-27 RX ADMIN — ASPIRIN 81 MG: 81 TABLET, CHEWABLE ORAL at 08:20

## 2023-06-27 RX ADMIN — SODIUM CHLORIDE: 9 INJECTION, SOLUTION INTRAVENOUS at 13:56

## 2023-06-27 RX ADMIN — LEVOTHYROXINE SODIUM 50 MCG: 0.05 TABLET ORAL at 06:04

## 2023-06-27 RX ADMIN — SODIUM CHLORIDE 500 ML: 9 INJECTION, SOLUTION INTRAVENOUS at 09:46

## 2023-06-27 RX ADMIN — HYDROCODONE BITARTRATE AND ACETAMINOPHEN 1 TABLET: 5; 325 TABLET ORAL at 06:04

## 2023-06-27 RX ADMIN — ENOXAPARIN SODIUM 40 MG: 100 INJECTION SUBCUTANEOUS at 08:20

## 2023-06-27 RX ADMIN — FOLIC ACID 1 MG: 1 TABLET ORAL at 08:20

## 2023-06-27 ASSESSMENT — ENCOUNTER SYMPTOMS
ABDOMINAL PAIN: 0
COUGH: 0
SHORTNESS OF BREATH: 0

## 2023-06-27 ASSESSMENT — PAIN DESCRIPTION - LOCATION
LOCATION: CHEST
LOCATION: CHEST

## 2023-06-27 ASSESSMENT — PAIN SCALES - GENERAL
PAINLEVEL_OUTOF10: 8
PAINLEVEL_OUTOF10: 8

## 2023-06-27 ASSESSMENT — PAIN DESCRIPTION - PAIN TYPE: TYPE: ACUTE PAIN

## 2023-06-27 ASSESSMENT — PAIN DESCRIPTION - ORIENTATION: ORIENTATION: LEFT

## 2023-06-27 ASSESSMENT — PAIN DESCRIPTION - DESCRIPTORS: DESCRIPTORS: ACHING;SHARP

## 2023-06-30 LAB — METHYLMALONATE SERPL-SCNC: 0.26 UMOL/L (ref 0–0.4)

## 2023-07-03 LAB
CARDIOLIPIN IGA SER IA-ACNC: 1.7 APL (ref 0–14)
CARDIOLIPIN IGG SER IA-ACNC: 2.6 GPL (ref 0–10)
CARDIOLIPIN IGM SER IA-ACNC: 2.4 MPL (ref 0–10)
DILUTE RUSSELL VIPER VENOM TIME: NORMAL
INR PPP: 1
PARTIAL THROMBOPLASTIN TIME: 35.6 SEC (ref 24–36)
PROTHROMBIN TIME: 13.1 SEC (ref 11.8–14.6)

## 2023-07-12 ENCOUNTER — HOSPITAL ENCOUNTER (OUTPATIENT)
Age: 48
Discharge: HOME | End: 2023-07-12
Payer: OTHER GOVERNMENT

## 2023-07-12 ENCOUNTER — OFFICE VISIT (OUTPATIENT)
Dept: ONCOLOGY | Age: 48
End: 2023-07-12
Payer: OTHER GOVERNMENT

## 2023-07-12 ENCOUNTER — TELEPHONE (OUTPATIENT)
Dept: ONCOLOGY | Age: 48
End: 2023-07-12

## 2023-07-12 VITALS
DIASTOLIC BLOOD PRESSURE: 107 MMHG | SYSTOLIC BLOOD PRESSURE: 174 MMHG | BODY MASS INDEX: 27.7 KG/M2 | WEIGHT: 161.4 LBS | HEART RATE: 64 BPM | TEMPERATURE: 97.4 F

## 2023-07-12 DIAGNOSIS — E61.1 IRON DEFICIENCY: ICD-10-CM

## 2023-07-12 DIAGNOSIS — D64.9 ANEMIA, UNSPECIFIED TYPE: Primary | ICD-10-CM

## 2023-07-12 DIAGNOSIS — D64.9 ANEMIA, UNSPECIFIED TYPE: ICD-10-CM

## 2023-07-12 LAB
BASOPHILS # BLD: 0 K/UL (ref 0–0.2)
BASOPHILS NFR BLD: 0 % (ref 0–2)
EOSINOPHIL # BLD: 0.19 K/UL (ref 0–0.4)
EOSINOPHILS RELATIVE PERCENT: 4 % (ref 1–4)
ERYTHROCYTE [DISTWIDTH] IN BLOOD BY AUTOMATED COUNT: 23.3 % (ref 12.5–15.4)
FERRITIN SERPL-MCNC: 46 NG/ML (ref 13–150)
HCT VFR BLD AUTO: 34.1 % (ref 36–46)
HCYS SERPL-SCNC: 12.4 UMOL/L
HGB BLD-MCNC: 10.6 G/DL (ref 12–16)
IRON SATN MFR SERPL: 9 % (ref 20–55)
IRON SERPL-MCNC: 31 UG/DL (ref 37–145)
LYMPHOCYTES # BLD: 23 % (ref 24–44)
LYMPHOCYTES NFR BLD: 1.1 K/UL (ref 1–4.8)
MCH RBC QN AUTO: 20.7 PG (ref 26–34)
MCHC RBC AUTO-ENTMCNC: 31.2 G/DL (ref 31–37)
MCV RBC AUTO: 66.2 FL (ref 80–100)
MONOCYTES NFR BLD: 0.19 K/UL (ref 0.1–0.8)
MONOCYTES NFR BLD: 4 % (ref 1–7)
MORPHOLOGY: ABNORMAL
NEUTROPHILS NFR BLD: 69 % (ref 36–66)
NEUTS SEG NFR BLD: 3.32 K/UL (ref 1.8–7.7)
PLATELET # BLD AUTO: 335 K/UL (ref 140–450)
PMV BLD AUTO: 6.4 FL (ref 6–12)
RBC # BLD AUTO: 5.15 M/UL (ref 4–5.2)
TIBC SERPL-MCNC: 346 UG/DL (ref 250–450)
UNSATURATED IRON BINDING CAPACITY: 315 UG/DL (ref 112–347)
WBC OTHER # BLD: 4.8 K/UL (ref 3.5–11)

## 2023-07-12 PROCEDURE — 83540 ASSAY OF IRON: CPT

## 2023-07-12 PROCEDURE — 36415 COLL VENOUS BLD VENIPUNCTURE: CPT

## 2023-07-12 PROCEDURE — 83090 ASSAY OF HOMOCYSTEINE: CPT

## 2023-07-12 PROCEDURE — 3074F SYST BP LT 130 MM HG: CPT | Performed by: INTERNAL MEDICINE

## 2023-07-12 PROCEDURE — 82746 ASSAY OF FOLIC ACID SERUM: CPT

## 2023-07-12 PROCEDURE — 83550 IRON BINDING TEST: CPT

## 2023-07-12 PROCEDURE — 3078F DIAST BP <80 MM HG: CPT | Performed by: INTERNAL MEDICINE

## 2023-07-12 PROCEDURE — 99214 OFFICE O/P EST MOD 30 MIN: CPT | Performed by: INTERNAL MEDICINE

## 2023-07-12 PROCEDURE — 99211 OFF/OP EST MAY X REQ PHY/QHP: CPT | Performed by: INTERNAL MEDICINE

## 2023-07-12 PROCEDURE — 85027 COMPLETE CBC AUTOMATED: CPT

## 2023-07-12 PROCEDURE — 82728 ASSAY OF FERRITIN: CPT

## 2023-07-12 NOTE — PATIENT INSTRUCTIONS
Labs now   Rv labs on labs from today     Pt will follow up at [facility] [place] office

## 2023-07-12 NOTE — TELEPHONE ENCOUNTER
AVS from 7/12/23      Labs now   Rv labs on labs from today      Pt will follow up at [address] office       Pt is having labs today     Reached out to [facility] to schedule rv    Pt was given AVS and appointment schedule    Electronically signed by Jasvir Dowling on 7/12/2023 at 3:21 PM

## 2023-07-13 LAB — FOLATE SERPL-MCNC: >20 NG/ML

## 2023-07-13 RX ORDER — FERROUS SULFATE 325(65) MG
325 TABLET ORAL 2 TIMES DAILY
Qty: 60 TABLET | Refills: 5 | Status: SHIPPED | OUTPATIENT
Start: 2023-07-13

## 2023-07-13 NOTE — PROGRESS NOTES
8.6 - 10.4 mg/dL    Sodium 140 135 - 144 mmol/L    Potassium 4.1 3.7 - 5.3 mmol/L    Chloride 107 98 - 107 mmol/L    CO2 24 20 - 31 mmol/L    Anion Gap 9 9 - 17 mmol/L   CBC with Auto Differential   Result Value Ref Range    WBC 4.4 3.5 - 11.0 k/uL    RBC 4.45 4.0 - 5.2 m/uL    Hemoglobin 8.4 (L) 12.0 - 16.0 g/dL    Hematocrit 27.0 (L) 36 - 46 %    MCV 60.7 (L) 80 - 100 fL    MCH 18.8 (L) 26 - 34 pg    MCHC 31.0 31 - 37 g/dL    RDW 16.9 (H) 11.5 - 14.9 %    Platelets 452 681 - 157 k/uL    MPV 6.4 6.0 - 12.0 fL    Seg Neutrophils 62 36 - 66 %    Lymphocytes 25 24 - 44 %    Monocytes 5 1 - 7 %    Eosinophils % 7 (H) 0 - 4 %    Basophils 1 0 - 2 %    Segs Absolute 2.73 1.3 - 9.1 k/uL    Absolute Lymph # 1.10 1.0 - 4.8 k/uL    Absolute Mono # 0.22 0.1 - 1.3 k/uL    Absolute Eos # 0.31 0.0 - 0.4 k/uL    Basophils Absolute 0.04 0.0 - 0.2 k/uL    Morphology ANISOCYTOSIS PRESENT     Morphology MICROCYTOSIS PRESENT     Morphology HYPOCHROMIA PRESENT     Morphology 1+ POLYCHROMASIA     Morphology 1+ ELLIPTOCYTES     Morphology 1+ TEARDROPS    Basic Metabolic Panel   Result Value Ref Range    Glucose 109 (H) 70 - 99 mg/dL    BUN 13 6 - 20 mg/dL    Creatinine 1.02 (H) 0.50 - 0.90 mg/dL    Est, Glom Filt Rate >60 >60 mL/min/1.73m2    Calcium 8.7 8.6 - 10.4 mg/dL    Sodium 137 135 - 144 mmol/L    Potassium 3.9 3.7 - 5.3 mmol/L    Chloride 104 98 - 107 mmol/L    CO2 22 20 - 31 mmol/L    Anion Gap 11 9 - 17 mmol/L   EKG 12 Lead   Result Value Ref Range    Ventricular Rate 72 BPM    Atrial Rate 72 BPM    P-R Interval 148 ms    QRS Duration 78 ms    Q-T Interval 404 ms    QTc Calculation (Bazett) 442 ms    P Axis 40 degrees    R Axis 35 degrees    T Axis 33 degrees   EKG 12 Lead   Result Value Ref Range    Ventricular Rate 60 BPM    Atrial Rate 60 BPM    P-R Interval 156 ms    QRS Duration 78 ms    Q-T Interval 420 ms    QTc Calculation (Bazett) 420 ms    P Axis 61 degrees    R Axis 42 degrees    T Axis 54 degrees   ECHO Complete 2D

## 2024-05-22 ENCOUNTER — TELEMEDICINE (OUTPATIENT)
Dept: NEUROLOGY | Age: 49
End: 2024-05-22
Payer: OTHER GOVERNMENT

## 2024-05-22 ENCOUNTER — APPOINTMENT (OUTPATIENT)
Dept: CT IMAGING | Age: 49
End: 2024-05-22
Payer: OTHER GOVERNMENT

## 2024-05-22 ENCOUNTER — HOSPITAL ENCOUNTER (INPATIENT)
Age: 49
LOS: 3 days | Discharge: HOME | End: 2024-05-25
Attending: EMERGENCY MEDICINE | Admitting: PSYCHIATRY & NEUROLOGY
Payer: OTHER GOVERNMENT

## 2024-05-22 DIAGNOSIS — I63.412 CEREBROVASCULAR ACCIDENT (CVA) DUE TO EMBOLISM OF LEFT MIDDLE CEREBRAL ARTERY (HCC): ICD-10-CM

## 2024-05-22 DIAGNOSIS — I63.9 ISCHEMIC STROKE (HCC): ICD-10-CM

## 2024-05-22 DIAGNOSIS — R47.01 EXPRESSIVE APHASIA SYNDROME: ICD-10-CM

## 2024-05-22 DIAGNOSIS — I63.00 CEREBROVASCULAR ACCIDENT (CVA) DUE TO THROMBOSIS OF PRECEREBRAL ARTERY (HCC): Primary | ICD-10-CM

## 2024-05-22 LAB
AMPHET UR QL SCN: NEGATIVE
ANION GAP SERPL CALCULATED.3IONS-SCNC: 14 MMOL/L (ref 9–16)
APAP SERPL-MCNC: 13 UG/ML (ref 10–30)
BARBITURATES UR QL SCN: NEGATIVE
BENZODIAZ UR QL: POSITIVE
BUN SERPL-MCNC: 13 MG/DL (ref 6–20)
CALCIUM SERPL-MCNC: 8.7 MG/DL (ref 8.6–10.4)
CANNABINOIDS UR QL SCN: NEGATIVE
CHLORIDE SERPL-SCNC: 104 MMOL/L (ref 98–107)
CO2 SERPL-SCNC: 20 MMOL/L (ref 20–31)
COCAINE UR QL SCN: NEGATIVE
CREAT SERPL-MCNC: 1 MG/DL (ref 0.5–0.9)
ERYTHROCYTE [DISTWIDTH] IN BLOOD BY AUTOMATED COUNT: 19.2 % (ref 11.8–14.4)
ETHANOL PERCENT: <0.01 %
ETHANOLAMINE SERPL-MCNC: <10 MG/DL (ref 0–0.08)
FENTANYL UR QL: NEGATIVE
GFR, ESTIMATED: 67 ML/MIN/1.73M2
GLUCOSE SERPL-MCNC: 91 MG/DL (ref 74–99)
HCT VFR BLD AUTO: 32.8 % (ref 36.3–47.1)
HCYS SERPL-SCNC: 28 UMOL/L (ref 0–15)
HGB BLD-MCNC: 9 G/DL (ref 11.9–15.1)
MCH RBC QN AUTO: 18.3 PG (ref 25.2–33.5)
MCHC RBC AUTO-ENTMCNC: 27.4 G/DL (ref 28.4–34.8)
MCV RBC AUTO: 66.8 FL (ref 82.6–102.9)
METHADONE UR QL: NEGATIVE
NRBC BLD-RTO: 0 PER 100 WBC
OPIATES UR QL SCN: NEGATIVE
OXYCODONE UR QL SCN: NEGATIVE
PCP UR QL SCN: NEGATIVE
PLATELET # BLD AUTO: 338 K/UL (ref 138–453)
PMV BLD AUTO: 8.9 FL (ref 8.1–13.5)
POTASSIUM SERPL-SCNC: 4 MMOL/L (ref 3.7–5.3)
RBC # BLD AUTO: 4.91 M/UL (ref 3.95–5.11)
SALICYLATES SERPL-MCNC: 1.8 MG/DL (ref 0–10)
SODIUM SERPL-SCNC: 138 MMOL/L (ref 136–145)
TEST INFORMATION: ABNORMAL
WBC OTHER # BLD: 4.9 K/UL (ref 3.5–11.3)

## 2024-05-22 PROCEDURE — 85027 COMPLETE CBC AUTOMATED: CPT

## 2024-05-22 PROCEDURE — 94761 N-INVAS EAR/PLS OXIMETRY MLT: CPT

## 2024-05-22 PROCEDURE — 99284 EMERGENCY DEPT VISIT MOD MDM: CPT | Performed by: PSYCHIATRY & NEUROLOGY

## 2024-05-22 PROCEDURE — 2000000000 HC ICU R&B

## 2024-05-22 PROCEDURE — 85240 CLOT FACTOR VIII AHG 1 STAGE: CPT

## 2024-05-22 PROCEDURE — 6370000000 HC RX 637 (ALT 250 FOR IP)

## 2024-05-22 PROCEDURE — 2580000003 HC RX 258

## 2024-05-22 PROCEDURE — 96374 THER/PROPH/DIAG INJ IV PUSH: CPT

## 2024-05-22 PROCEDURE — 85307 ASSAY ACTIVATED PROTEIN C: CPT

## 2024-05-22 PROCEDURE — 6360000002 HC RX W HCPCS: Performed by: EMERGENCY MEDICINE

## 2024-05-22 PROCEDURE — 85302 CLOT INHIBIT PROT C ANTIGEN: CPT

## 2024-05-22 PROCEDURE — 99285 EMERGENCY DEPT VISIT HI MDM: CPT

## 2024-05-22 PROCEDURE — 80048 BASIC METABOLIC PNL TOTAL CA: CPT

## 2024-05-22 PROCEDURE — 70450 CT HEAD/BRAIN W/O DYE: CPT

## 2024-05-22 PROCEDURE — 85305 CLOT INHIBIT PROT S TOTAL: CPT

## 2024-05-22 PROCEDURE — 80143 DRUG ASSAY ACETAMINOPHEN: CPT

## 2024-05-22 PROCEDURE — 2060000000 HC ICU INTERMEDIATE R&B

## 2024-05-22 PROCEDURE — 85303 CLOT INHIBIT PROT C ACTIVITY: CPT

## 2024-05-22 PROCEDURE — 81241 F5 GENE: CPT

## 2024-05-22 PROCEDURE — 6360000002 HC RX W HCPCS

## 2024-05-22 PROCEDURE — 85730 THROMBOPLASTIN TIME PARTIAL: CPT

## 2024-05-22 PROCEDURE — G0480 DRUG TEST DEF 1-7 CLASSES: HCPCS

## 2024-05-22 PROCEDURE — 80179 DRUG ASSAY SALICYLATE: CPT

## 2024-05-22 PROCEDURE — 36415 COLL VENOUS BLD VENIPUNCTURE: CPT

## 2024-05-22 PROCEDURE — 86147 CARDIOLIPIN ANTIBODY EA IG: CPT

## 2024-05-22 PROCEDURE — 85300 ANTITHROMBIN III ACTIVITY: CPT

## 2024-05-22 PROCEDURE — 85306 CLOT INHIBIT PROT S FREE: CPT

## 2024-05-22 PROCEDURE — 85610 PROTHROMBIN TIME: CPT

## 2024-05-22 PROCEDURE — 80307 DRUG TEST PRSMV CHEM ANLYZR: CPT

## 2024-05-22 PROCEDURE — 81240 F2 GENE: CPT

## 2024-05-22 PROCEDURE — 85613 RUSSELL VIPER VENOM DILUTED: CPT

## 2024-05-22 PROCEDURE — 83090 ASSAY OF HOMOCYSTEINE: CPT

## 2024-05-22 RX ORDER — ONDANSETRON 2 MG/ML
4 INJECTION INTRAMUSCULAR; INTRAVENOUS ONCE
Status: COMPLETED | OUTPATIENT
Start: 2024-05-22 | End: 2024-05-22

## 2024-05-22 RX ORDER — SODIUM CHLORIDE 0.9 % (FLUSH) 0.9 %
5-40 SYRINGE (ML) INJECTION EVERY 12 HOURS SCHEDULED
Status: DISCONTINUED | OUTPATIENT
Start: 2024-05-22 | End: 2024-05-25 | Stop reason: HOSPADM

## 2024-05-22 RX ORDER — POLYETHYLENE GLYCOL 3350 17 G/17G
17 POWDER, FOR SOLUTION ORAL DAILY PRN
Status: DISCONTINUED | OUTPATIENT
Start: 2024-05-22 | End: 2024-05-25 | Stop reason: HOSPADM

## 2024-05-22 RX ORDER — SODIUM CHLORIDE 9 MG/ML
INJECTION, SOLUTION INTRAVENOUS CONTINUOUS
Status: DISCONTINUED | OUTPATIENT
Start: 2024-05-22 | End: 2024-05-23

## 2024-05-22 RX ORDER — ONDANSETRON 2 MG/ML
4 INJECTION INTRAMUSCULAR; INTRAVENOUS EVERY 6 HOURS PRN
Status: DISCONTINUED | OUTPATIENT
Start: 2024-05-22 | End: 2024-05-25 | Stop reason: HOSPADM

## 2024-05-22 RX ORDER — ONDANSETRON 4 MG/1
4 TABLET, ORALLY DISINTEGRATING ORAL EVERY 8 HOURS PRN
Status: DISCONTINUED | OUTPATIENT
Start: 2024-05-22 | End: 2024-05-25 | Stop reason: HOSPADM

## 2024-05-22 RX ORDER — FOLIC ACID 1 MG/1
1 TABLET ORAL 2 TIMES DAILY
Status: DISCONTINUED | OUTPATIENT
Start: 2024-05-22 | End: 2024-05-25 | Stop reason: HOSPADM

## 2024-05-22 RX ORDER — ASPIRIN 81 MG/1
81 TABLET, CHEWABLE ORAL DAILY
Status: DISCONTINUED | OUTPATIENT
Start: 2024-05-23 | End: 2024-05-25 | Stop reason: HOSPADM

## 2024-05-22 RX ORDER — LABETALOL HYDROCHLORIDE 5 MG/ML
10 INJECTION, SOLUTION INTRAVENOUS
Status: DISCONTINUED | OUTPATIENT
Start: 2024-05-22 | End: 2024-05-25 | Stop reason: HOSPADM

## 2024-05-22 RX ORDER — SODIUM CHLORIDE 9 MG/ML
INJECTION, SOLUTION INTRAVENOUS PRN
Status: DISCONTINUED | OUTPATIENT
Start: 2024-05-22 | End: 2024-05-25 | Stop reason: HOSPADM

## 2024-05-22 RX ORDER — LEVOTHYROXINE SODIUM 0.05 MG/1
50 TABLET ORAL DAILY
Status: DISCONTINUED | OUTPATIENT
Start: 2024-05-22 | End: 2024-05-25 | Stop reason: HOSPADM

## 2024-05-22 RX ORDER — ASPIRIN 300 MG/1
300 SUPPOSITORY RECTAL DAILY
Status: DISCONTINUED | OUTPATIENT
Start: 2024-05-23 | End: 2024-05-25 | Stop reason: HOSPADM

## 2024-05-22 RX ORDER — ATORVASTATIN CALCIUM 80 MG/1
80 TABLET, FILM COATED ORAL NIGHTLY
Status: DISCONTINUED | OUTPATIENT
Start: 2024-05-22 | End: 2024-05-25 | Stop reason: HOSPADM

## 2024-05-22 RX ORDER — SODIUM CHLORIDE 0.9 % (FLUSH) 0.9 %
5-40 SYRINGE (ML) INJECTION PRN
Status: DISCONTINUED | OUTPATIENT
Start: 2024-05-22 | End: 2024-05-25 | Stop reason: HOSPADM

## 2024-05-22 RX ADMIN — LABETALOL HYDROCHLORIDE 10 MG: 5 INJECTION, SOLUTION INTRAVENOUS at 16:52

## 2024-05-22 RX ADMIN — ONDANSETRON 4 MG: 2 INJECTION INTRAMUSCULAR; INTRAVENOUS at 13:46

## 2024-05-22 RX ADMIN — SODIUM CHLORIDE: 9 INJECTION, SOLUTION INTRAVENOUS at 16:45

## 2024-05-22 RX ADMIN — FOLIC ACID 1 MG: 1 TABLET ORAL at 21:09

## 2024-05-22 RX ADMIN — LEVOTHYROXINE SODIUM 50 MCG: 50 TABLET ORAL at 16:52

## 2024-05-22 RX ADMIN — ATORVASTATIN CALCIUM 80 MG: 80 TABLET, FILM COATED ORAL at 21:09

## 2024-05-22 RX ADMIN — SODIUM CHLORIDE, PRESERVATIVE FREE 10 ML: 5 INJECTION INTRAVENOUS at 21:09

## 2024-05-22 ASSESSMENT — PAIN SCALES - GENERAL: PAINLEVEL_OUTOF10: 3

## 2024-05-22 NOTE — ED NOTES
ED to inpatient nurses report      Chief Complaint:  Chief Complaint   Patient presents with    Cerebrovascular Accident     Present to ED from: [facility]     MOA:     LOC: alert and orientated to name and place  Mobility: Requires assistance * 1  Oxygen Baseline: RA    Current needs required: RA   Pending ED orders: None  Present condition: Stable     Why did the patient come to the ED? Pt arrives via life flight, transferred from [facility]   Pt has a hx of R parietal lobe stroke 2 years ago, with unknown symptoms, where symptoms have resolved   Pt was transferred from [facility] where she went there after work  Pt was having aphasia that started around 9 am this morning   Pt received TNK at 1145 today, and also had zofran   Life flight reports that when they assessed, she was not able to move her R side, symptoms have resolved upon arrival   Life flight also reports that her anterior lower gums started bleeding around 1200 from the TNK   [facility] also states pt has a hx of behavioral issues and symptoms may be related   Pt placed on cardiac monitor, continuous pulse ox, and BP cuff.  RR even and unlabored.   NAD noted.   Whiteboard updated.  Will continue with plan of care.    What is the plan? Admit for CVA   Any procedures or intervention occur? Scan   Any safety concerns?? Needs assist     Mental Status:  Level of Consciousness: Alert (0)    Psych Assessment:   Psychosocial  Psychosocial (WDL): Within Defined Limits  Vital signs   Vitals:    05/22/24 1226 05/22/24 1227 05/22/24 1241 05/22/24 1242   BP:   (!) 135/96    Pulse: 93  79    Resp: 18  17    Temp:  97.4 °F (36.3 °C)     TempSrc:  Oral     SpO2: 99%  99%    Weight:    72.6 kg (160 lb)   Height:    1.626 m (5' 4\")        Vitals:  Patient Vitals for the past 24 hrs:   BP Temp Temp src Pulse Resp SpO2 Height Weight   05/22/24 1242 -- -- -- -- -- -- 1.626 m (5' 4\") 72.6 kg (160 lb)   05/22/24 1241 (!) 135/96 -- -- 79 17 99 % -- --   05/22/24

## 2024-05-22 NOTE — ED PROVIDER NOTES
[facility] NEURO ICU  Emergency Department Encounter  Emergency Medicine      Pt Name:Lilia Portillo  MRN: [id number]  Birthdate 1975  Date of evaluation: 24  PCP:  No primary care provider on file.  12:37 PM EDT      CHIEF COMPLAINT       Chief Complaint   Patient presents with    Cerebrovascular Accident       HISTORY OF PRESENT ILLNESS  (Location/Symptom, Timing/Onset, Context/Setting, Quality, Duration, Modifying Factors, Severity.)      Lilia Portillo is a 49 y.o. female who presents with after she presented to OSH with confusion and speech difficulty, prior stroke, cth negative forbleed and TNK given, and patient sent for further eval  She is awake, but confused, oriented to person but not place or time, does have some word salad, and confusion  Reports some R sided deficits but on exam equal and obeys commands.intermittently crying and screaming,   Nuero at bedside upon arrival      PAST MEDICAL / SURGICAL / SOCIAL / FAMILY HISTORY      has a past medical history of Acute cerebral infarction (HCC) and Hypertension.       has a past surgical history that includes  section; Tubal ligation; transesophageal echocardiogram (2023); and Insertable Cardiac Monitor (2023).      Social History     Socioeconomic History    Marital status:      Spouse name: Not on file    Number of children: Not on file    Years of education: Not on file    Highest education level: Not on file   Occupational History    Not on file   Tobacco Use    Smoking status: Former     Current packs/day: 0.00     Types: Cigarettes     Quit date: 2023     Years since quittin.9    Smokeless tobacco: Never   Substance and Sexual Activity    Alcohol use: Never    Drug use: Never    Sexual activity: Not on file   Other Topics Concern    Not on file   Social History Narrative    Not on file     Social Determinants of Health     Financial Resource Strain: Not on file   Food Insecurity: Not on file

## 2024-05-22 NOTE — ED NOTES
Lilia Portillo, 49-year-old female sudden onset of expressive aphasia here at 0800 hrs. today.  History of a prior CVA.  CT scan of the brain negative, currently being seen by telestroke to determine whether thrombolytics will be given.  Excepted by Dr. Cervantes

## 2024-05-22 NOTE — ED PROVIDER NOTES
[facility] ED  Emergency Department  Emergency Medicine Resident Turn-Over     Note Started: 2:01 PM EDT    Care of Lliia Portillo was assumed from Dr. Clark and is being seen for Cerebrovascular Accident  .  The patient's initial evaluation and plan have been discussed with the prior provider who initially evaluated the patient.     EMERGENCY DEPARTMENT COURSE / MEDICAL DECISION MAKING:       MEDICATIONS GIVEN:  Orders Placed This Encounter   Medications    ondansetron (ZOFRAN) injection 4 mg       LABS / RADIOLOGY:     Labs Reviewed   CBC - Abnormal; Notable for the following components:       Result Value    Hemoglobin 9.0 (*)     Hematocrit 32.8 (*)     MCV 66.8 (*)     MCH 18.3 (*)     MCHC 27.4 (*)     RDW 19.2 (*)     All other components within normal limits   BASIC METABOLIC PANEL - Abnormal; Notable for the following components:    Creatinine 1.0 (*)     All other components within normal limits   TOX SCR, BLD, ED   URINE DRUG SCREEN       CT HEAD WO CONTRAST    Result Date: 5/22/2024  EXAMINATION: CT OF THE HEAD WITHOUT CONTRAST  5/22/2024 12:45 pm TECHNIQUE: CT of the head was performed without the administration of intravenous contrast. Automated exposure control, iterative reconstruction, and/or weight based adjustment of the mA/kV was utilized to reduce the radiation dose to as low as reasonably achievable. COMPARISON: None. HISTORY: ORDERING SYSTEM PROVIDED HISTORY: stroke alert / aphasia / dysarthria TECHNOLOGIST PROVIDED HISTORY: stroke alert / aphasia / dysarthria Decision Support Exception - unselect if not a suspected or confirmed emergency medical condition->Emergency Medical Condition (MA) Is the patient pregnant?->No Reason for Exam: stroke alert / aphasia / dysarthria FINDINGS: BRAIN/VENTRICLES: There is no acute intracranial hemorrhage, mass effect or midline shift.  No abnormal extra-axial fluid collection.  The gray-white differentiation is maintained without

## 2024-05-22 NOTE — PROGRESS NOTES
history of Acute cerebral infarction (HCC) and Hypertension.  Social History  Social History     Socioeconomic History    Marital status:      Spouse name: Not on file    Number of children: Not on file    Years of education: Not on file    Highest education level: Not on file   Occupational History    Not on file   Tobacco Use    Smoking status: Former     Current packs/day: 0.00     Types: Cigarettes     Quit date: 2023     Years since quittin.9    Smokeless tobacco: Never   Substance and Sexual Activity    Alcohol use: Never    Drug use: Never    Sexual activity: Not on file   Other Topics Concern    Not on file   Social History Narrative    Not on file     Social Determinants of Health     Financial Resource Strain: Not on file   Food Insecurity: Not on file   Transportation Needs: Not on file   Physical Activity: Not on file   Stress: Not on file   Social Connections: Not on file   Intimate Partner Violence: Not on file   Housing Stability: Not on file     Family History      Problem Relation Age of Onset    Diabetes Mother     Diabetes Father        OBJECTIVE  There were no vitals taken for this visit.       Pre-Morbid mRS: 0    Imaging:  Ct head no hemorrhage on report    Assessment     49 y.o. female history of htn, hld last well 8:00 am with trouble speaking able to enunciate with a lot of effort and noted tearfulness and crying.  Able to write but with episodes of hesitancy.   Differential DDx:  Stroke vs seizures vs anxiety      Recommendations:  NIH 6  Recommend Inpatient Neurology Consult for further assessment and evaluation   I have informed the patient and/or family of all the associated risks including 6% of sich/death, benefits of potential improved thrombolysis, and alternative of using IV tenectaplase instead of IV t-PA. The patient and/or family voluntarily consent to the administration of IV Tenectaplase. Use of this thrombolytic has been studied and found comparable to IV

## 2024-05-22 NOTE — ED NOTES
Pt assisted onto bedpan per writer.    Labeled urine specimen sent to lab via tube system.    Urine Sample  []  Clean catch  []  Straight cath  [x]  Urine voided  []  Indwelling catheter  []  Suprapubic catheter

## 2024-05-22 NOTE — PROGRESS NOTES
[facility]     Emergency/Trauma Note    PATIENT NAME: Lilia Portillo    Shift date: 05/22/2024  Shift day: Wednesday   Shift # 1    Room # 17/17     Name: Lilia Portillo            Age: 49 y.o.  Gender: female          Baptist: None   Place of L[facility] day:     Trauma/Incident type: Stroke Alert  Admit Date & Time: 5/22/2024 12:18 PM  TRAUMA NAME: None    ADVANCE DIRECTIVES IN CHART?  No    NAME OF DECISION MAKER:     RELATIONSHIP OF DECISION MAKER TO PATIENT:     PATIENT/EVENT DESCRIPTION:  Lilia Portillo is a 49 y.o. female who arrived ER as stroke alert. Patient to be admitted to 17/17.       SPIRITUAL ASSESSMENT-INTERVENTION-OUTCOME:  No spiritual assessment was carried out because patient was sleeping when  entered her room.  Patient's mother in-law, Sharee, was present and open to spiritual care. Sharee looked a bit worried and asked  for prayers.  maintained supportive presence and prayed with Sharee at patient's bedside.      PATIENT BELONGINGS:  No belongings noted    ANY BELONGINGS OF SIGNIFICANT VALUE NOTED:  Unknown    REGISTRATION STAFF NOTIFIED?  Yes    WHAT IS YOUR SPIRITUAL CARE PLAN FOR THIS PATIENT?:  Follow up visits recommended for ongoing assessment of patient's condition and for more spiritual and emotional support.     Electronically signed by Chaplain Azucena, on 5/22/2024 at 2:31 PM.  [facility]  [phone]

## 2024-05-22 NOTE — H&P
Neuro ICU History & Physical    Patient Name: Lilia Portillo  Patient : 1975  Room/Bed:   Code Status: full  Allergies: No Known Allergies    CHIEF COMPLAINT     Chief Complaint   Patient presents with    Cerebrovascular Accident       HPI    History Obtained From: Patient's , electronic medical record    The patient is a 49 y.o. female with a history of previous ischemic stroke (), hypertension, previous smoker who presented as a transfer from [facility] where she was seen for acute onset aphasia and altered mental status.  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  Patient was evaluated via telestroke at the [facility] emergency department, unknown initial NIH, CT head negative for acute intracranial abnormality, CTA negative for LVO or significant flow-limiting stenosis.  Patient was then given TNK at the [facility] emergency department and transferred to [facility] for further management.  On arrival patient has continued expressive aphasia, NIH 7.  Of note per patient's  she was supposed to be taking aspirin and antihypertensive medications however has had issues with noncompliance to medications in the past and has not been taking her medications for unknown time period.       Admitted to ICU From: Emergency department  Reason for ICU Admission: Aphasia s/p TNK administration       PATIENT HISTORY   Past Medical History:        Diagnosis Date    Acute cerebral infarction (HCC) 2023    Hypertension        Past Surgical History:        Procedure Laterality Date     SECTION      INSERTABLE CARDIAC MONITOR  2023    TRANSESOPHAGEAL ECHOCARDIOGRAM  2023    EDMUNDO/LOOP RECORDER    TUBAL LIGATION         Social History:   Social History

## 2024-05-22 NOTE — ED NOTES
Pt arrives via life flight, transferred from [facility]   Pt has a hx of R parietal lobe stroke 2 years ago, with unknown symptoms, where symptoms have resolved   Pt was transferred from [facility] where she went there after work  Pt was having aphasia that started around 9 am this morning   Pt received TNK at 1145 today, and also had zofran   Life flight reports that when they assessed, she was not able to move her R side, symptoms have resolved upon arrival   Life flight also reports that her anterior lower gums started bleeding around 1200 from the TNK   [facility] also states pt has a hx of behavioral issues and symptoms may be related   Pt placed on cardiac monitor, continuous pulse ox, and BP cuff.  RR even and unlabored.   NAD noted.   Whiteboard updated.  Will continue with plan of care.

## 2024-05-23 ENCOUNTER — APPOINTMENT (OUTPATIENT)
Dept: MRI IMAGING | Age: 49
End: 2024-05-23
Payer: OTHER GOVERNMENT

## 2024-05-23 ENCOUNTER — APPOINTMENT (OUTPATIENT)
Age: 49
End: 2024-05-23
Payer: OTHER GOVERNMENT

## 2024-05-23 ENCOUNTER — APPOINTMENT (OUTPATIENT)
Dept: CT IMAGING | Age: 49
End: 2024-05-23
Payer: OTHER GOVERNMENT

## 2024-05-23 LAB
ANION GAP SERPL CALCULATED.3IONS-SCNC: 11 MMOL/L (ref 9–16)
AT III ACT/NOR PPP CHRO: 128 % (ref 83–122)
BUN SERPL-MCNC: 8 MG/DL (ref 6–20)
CALCIUM SERPL-MCNC: 8.5 MG/DL (ref 8.6–10.4)
CHLORIDE SERPL-SCNC: 107 MMOL/L (ref 98–107)
CHOLEST SERPL-MCNC: 178 MG/DL (ref 0–199)
CHOLESTEROL/HDL RATIO: 5
CO2 SERPL-SCNC: 21 MMOL/L (ref 20–31)
CREAT SERPL-MCNC: 0.9 MG/DL (ref 0.5–0.9)
D DIMER PPP FEU-MCNC: 0.33 UG/ML FEU (ref 0–0.57)
DILUTE RUSSELL VIPER VENOM TIME: NORMAL
ERYTHROCYTE [DISTWIDTH] IN BLOOD BY AUTOMATED COUNT: 19 % (ref 11.8–14.4)
EST. AVERAGE GLUCOSE BLD GHB EST-MCNC: 111 MG/DL
FACTOR VIII ACTIVITY: 137 % (ref 50–150)
GFR, ESTIMATED: 75 ML/MIN/1.73M2
GLUCOSE SERPL-MCNC: 113 MG/DL (ref 74–99)
HBA1C MFR BLD: 5.5 % (ref 4–6)
HCT VFR BLD AUTO: 32.1 % (ref 36.3–47.1)
HDLC SERPL-MCNC: 33 MG/DL
HGB BLD-MCNC: 8.9 G/DL (ref 11.9–15.1)
INR PPP: 0.9
LDLC SERPL CALC-MCNC: 113 MG/DL (ref 0–100)
MCH RBC QN AUTO: 18.3 PG (ref 25.2–33.5)
MCHC RBC AUTO-ENTMCNC: 27.7 G/DL (ref 28.4–34.8)
MCV RBC AUTO: 65.9 FL (ref 82.6–102.9)
NRBC BLD-RTO: 0 PER 100 WBC
PARTIAL THROMBOPLASTIN TIME: 29.5 SEC (ref 23–36.5)
PLATELET # BLD AUTO: 284 K/UL (ref 138–453)
PMV BLD AUTO: 8.3 FL (ref 8.1–13.5)
POTASSIUM SERPL-SCNC: 4 MMOL/L (ref 3.7–5.3)
PROTEIN C ACTIVITY: 106 %
PROTHROMBIN TIME: 12.5 SEC (ref 11.7–14.9)
RBC # BLD AUTO: 4.87 M/UL (ref 3.95–5.11)
SODIUM SERPL-SCNC: 139 MMOL/L (ref 136–145)
T4 FREE SERPL-MCNC: 0.6 NG/DL (ref 0.92–1.68)
TRIGL SERPL-MCNC: 157 MG/DL
TSH SERPL DL<=0.05 MIU/L-ACNC: 7.96 UIU/ML (ref 0.27–4.2)
VLDLC SERPL CALC-MCNC: 31 MG/DL
WBC OTHER # BLD: 7.2 K/UL (ref 3.5–11.3)

## 2024-05-23 PROCEDURE — 99255 IP/OBS CONSLTJ NEW/EST HI 80: CPT | Performed by: INTERNAL MEDICINE

## 2024-05-23 PROCEDURE — 97166 OT EVAL MOD COMPLEX 45 MIN: CPT

## 2024-05-23 PROCEDURE — 83036 HEMOGLOBIN GLYCOSYLATED A1C: CPT

## 2024-05-23 PROCEDURE — 2060000000 HC ICU INTERMEDIATE R&B

## 2024-05-23 PROCEDURE — 81241 F5 GENE: CPT

## 2024-05-23 PROCEDURE — 99254 IP/OBS CNSLTJ NEW/EST MOD 60: CPT | Performed by: PSYCHIATRY & NEUROLOGY

## 2024-05-23 PROCEDURE — 84439 ASSAY OF FREE THYROXINE: CPT

## 2024-05-23 PROCEDURE — 6370000000 HC RX 637 (ALT 250 FOR IP)

## 2024-05-23 PROCEDURE — 97530 THERAPEUTIC ACTIVITIES: CPT

## 2024-05-23 PROCEDURE — 2580000003 HC RX 258

## 2024-05-23 PROCEDURE — 6360000002 HC RX W HCPCS

## 2024-05-23 PROCEDURE — 70450 CT HEAD/BRAIN W/O DYE: CPT

## 2024-05-23 PROCEDURE — 99233 SBSQ HOSP IP/OBS HIGH 50: CPT | Performed by: STUDENT IN AN ORGANIZED HEALTH CARE EDUCATION/TRAINING PROGRAM

## 2024-05-23 PROCEDURE — 93306 TTE W/DOPPLER COMPLETE: CPT

## 2024-05-23 PROCEDURE — 85027 COMPLETE CBC AUTOMATED: CPT

## 2024-05-23 PROCEDURE — 80061 LIPID PANEL: CPT

## 2024-05-23 PROCEDURE — 36415 COLL VENOUS BLD VENIPUNCTURE: CPT

## 2024-05-23 PROCEDURE — 81240 F2 GENE: CPT

## 2024-05-23 PROCEDURE — 70551 MRI BRAIN STEM W/O DYE: CPT

## 2024-05-23 PROCEDURE — 97535 SELF CARE MNGMENT TRAINING: CPT

## 2024-05-23 PROCEDURE — 97162 PT EVAL MOD COMPLEX 30 MIN: CPT

## 2024-05-23 PROCEDURE — 93306 TTE W/DOPPLER COMPLETE: CPT | Performed by: INTERNAL MEDICINE

## 2024-05-23 PROCEDURE — 85379 FIBRIN DEGRADATION QUANT: CPT

## 2024-05-23 PROCEDURE — 86146 BETA-2 GLYCOPROTEIN ANTIBODY: CPT

## 2024-05-23 PROCEDURE — 94761 N-INVAS EAR/PLS OXIMETRY MLT: CPT

## 2024-05-23 PROCEDURE — 84443 ASSAY THYROID STIM HORMONE: CPT

## 2024-05-23 PROCEDURE — 80048 BASIC METABOLIC PNL TOTAL CA: CPT

## 2024-05-23 RX ORDER — MAGNESIUM SULFATE IN WATER 40 MG/ML
2000 INJECTION, SOLUTION INTRAVENOUS ONCE
Status: COMPLETED | OUTPATIENT
Start: 2024-05-23 | End: 2024-05-23

## 2024-05-23 RX ORDER — MAGNESIUM SULFATE 1 G/100ML
1000 INJECTION INTRAVENOUS ONCE
Status: DISCONTINUED | OUTPATIENT
Start: 2024-05-23 | End: 2024-05-23

## 2024-05-23 RX ORDER — ACETAMINOPHEN 500 MG
1000 TABLET ORAL ONCE
Status: COMPLETED | OUTPATIENT
Start: 2024-05-23 | End: 2024-05-23

## 2024-05-23 RX ORDER — METOCLOPRAMIDE HYDROCHLORIDE 5 MG/ML
5 INJECTION INTRAMUSCULAR; INTRAVENOUS ONCE
Status: DISCONTINUED | OUTPATIENT
Start: 2024-05-23 | End: 2024-05-23

## 2024-05-23 RX ORDER — MORPHINE SULFATE 2 MG/ML
2 INJECTION, SOLUTION INTRAMUSCULAR; INTRAVENOUS ONCE
Status: COMPLETED | OUTPATIENT
Start: 2024-05-23 | End: 2024-05-23

## 2024-05-23 RX ORDER — ENOXAPARIN SODIUM 100 MG/ML
40 INJECTION SUBCUTANEOUS DAILY
Status: DISCONTINUED | OUTPATIENT
Start: 2024-05-23 | End: 2024-05-25 | Stop reason: HOSPADM

## 2024-05-23 RX ORDER — ACETAMINOPHEN 325 MG/1
650 TABLET ORAL EVERY 4 HOURS PRN
Status: DISCONTINUED | OUTPATIENT
Start: 2024-05-23 | End: 2024-05-25 | Stop reason: HOSPADM

## 2024-05-23 RX ADMIN — ACETAMINOPHEN 650 MG: 325 TABLET ORAL at 22:18

## 2024-05-23 RX ADMIN — ENOXAPARIN SODIUM 40 MG: 100 INJECTION SUBCUTANEOUS at 17:15

## 2024-05-23 RX ADMIN — FOLIC ACID 1 MG: 1 TABLET ORAL at 08:13

## 2024-05-23 RX ADMIN — ACETAMINOPHEN 1000 MG: 500 TABLET ORAL at 05:53

## 2024-05-23 RX ADMIN — ONDANSETRON 4 MG: 2 INJECTION INTRAMUSCULAR; INTRAVENOUS at 08:14

## 2024-05-23 RX ADMIN — MAGNESIUM SULFATE HEPTAHYDRATE 2000 MG: 40 INJECTION, SOLUTION INTRAVENOUS at 17:48

## 2024-05-23 RX ADMIN — ASPIRIN 81 MG 81 MG: 81 TABLET ORAL at 16:00

## 2024-05-23 RX ADMIN — LEVOTHYROXINE SODIUM 50 MCG: 50 TABLET ORAL at 05:53

## 2024-05-23 RX ADMIN — SODIUM CHLORIDE, PRESERVATIVE FREE 10 ML: 5 INJECTION INTRAVENOUS at 08:14

## 2024-05-23 RX ADMIN — MORPHINE SULFATE 2 MG: 2 INJECTION, SOLUTION INTRAMUSCULAR; INTRAVENOUS at 12:13

## 2024-05-23 RX ADMIN — FOLIC ACID 1 MG: 1 TABLET ORAL at 22:18

## 2024-05-23 RX ADMIN — ONDANSETRON 4 MG: 2 INJECTION INTRAMUSCULAR; INTRAVENOUS at 19:25

## 2024-05-23 RX ADMIN — SODIUM CHLORIDE, PRESERVATIVE FREE 10 ML: 5 INJECTION INTRAVENOUS at 22:19

## 2024-05-23 RX ADMIN — ATORVASTATIN CALCIUM 80 MG: 80 TABLET, FILM COATED ORAL at 22:18

## 2024-05-23 ASSESSMENT — PAIN SCALES - GENERAL
PAINLEVEL_OUTOF10: 6
PAINLEVEL_OUTOF10: 2

## 2024-05-23 ASSESSMENT — ENCOUNTER SYMPTOMS
COLOR CHANGE: 0
TROUBLE SWALLOWING: 0
VOMITING: 0
SHORTNESS OF BREATH: 0
PHOTOPHOBIA: 0
NAUSEA: 0
WHEEZING: 0
VOICE CHANGE: 0
CHEST TIGHTNESS: 0

## 2024-05-23 NOTE — CONSULTS
Today's Date: 2024  Patient Name: Lilia Portillo  Date of admission: 2024 12:18 PM  Patient's age: 49 y.o., 1975  Admission Dx: Ischemic stroke (HCC) [I63.9]  Cerebrovascular accident (CVA) due to thrombosis of precerebral artery (HCC) [I63.00]    Reason for Consult: Ischemic stroke, hypercoag workup  Requesting Physician: Annetta Conrad MD    Chief Complaint: CVA    History Obtained From:  patient, electronic medical record    History of Present Illness:      The patient is a 49 y.o. female who is admitted to the hospital for management of ischemic stroke.  She has a past medical history of previous ischemic stroke in 2023, hypertension, previous smoker.  She presented to [facility] with acute aphasia and altered mental status.  She was evaluated by telestroke and given TNK at outlying ED and transferred to [place] for further care.    Patient's  states that she is fairly noncompliant and is supposed to be on aspirin and antihypertensives.    Hematology oncology has been consulted for hypercoagulable workup.    Past Medical History:   has a past medical history of Acute cerebral infarction (HCC) and Hypertension.    Past Surgical History:   has a past surgical history that includes  section; Tubal ligation; transesophageal echocardiogram (2023); and Insertable Cardiac Monitor (2023).     Medications:    Prior to Admission medications    Medication Sig Start Date End Date Taking? Authorizing Provider   amLODIPine (NORVASC) 5 MG tablet Take 1 tablet by mouth daily 8/15/23   Ivone Llamas, APRN - CNP   ferrous sulfate (IRON 325) 325 (65 Fe) MG tablet Take 1 tablet by mouth 2 times daily 23   Manolo Acuna MD   aspirin 81 MG chewable tablet Take 1 tablet by mouth daily 23   Alba Valero MD   atorvastatin (LIPITOR) 80 MG tablet Take 1 tablet by mouth nightly 23   Alba Valero MD   folic acid (FOLVITE) 1 MG tablet Take 1  parietal lobe.  A few scattered foci of T2 FLAIR hyperintensity are seen in the periventricular and subcortical white matter, which are nonspecific.  No evidence of hydrocephalus.  The normal signal voids within the major intracranial vessels appear maintained. ORBITS: The visualized portion of the orbits demonstrate no acute abnormality. SINUSES: Mucous retention cysts within the maxillary sinuses bilaterally. Mucosal thickening of the right ethmoid sinus.  There is a right mastoid effusion. BONES/SOFT TISSUES: The bone marrow signal intensity appears normal. The soft tissues demonstrate no acute abnormality.     1. Large acute infarct involving the mid to posterior left MCA territory. Sulcal effacement without significant mass effect or midline shift.  No evidence of hemorrhagic transformation. 2. Otherwise, no acute intracranial abnormality. 3. Small chronic infarct involving the right parietal lobe. These results were sent to the CORE Team on 5/23/2024 at 8:05 am to be communicated to the referring/covering health care provider/office.     CT HEAD WO CONTRAST    Result Date: 5/22/2024  EXAMINATION: CT OF THE HEAD WITHOUT CONTRAST  5/22/2024 12:45 pm TECHNIQUE: CT of the head was performed without the administration of intravenous contrast. Automated exposure control, iterative reconstruction, and/or weight based adjustment of the mA/kV was utilized to reduce the radiation dose to as low as reasonably achievable. COMPARISON: None. HISTORY: ORDERING SYSTEM PROVIDED HISTORY: stroke alert / aphasia / dysarthria TECHNOLOGIST PROVIDED HISTORY: stroke alert / aphasia / dysarthria Decision Support Exception - unselect if not a suspected or confirmed emergency medical condition->Emergency Medical Condition (MA) Is the patient pregnant?->No Reason for Exam: stroke alert / aphasia / dysarthria FINDINGS: BRAIN/VENTRICLES: There is no acute intracranial hemorrhage, mass effect or midline shift.  No abnormal extra-axial fluid

## 2024-05-23 NOTE — CARE COORDINATION
Case Management Assessment  Initial Evaluation    Date/Time of Evaluation: 5/23/2024 4:52 PM  Assessment Completed by: Zoe Bradshaw RN    If patient is discharged prior to next notation, then this note serves as note for discharge by case management.    Patient Name: Lilia Portillo                   YOB: 1975  Diagnosis: Ischemic stroke (HCC) [I63.9]  Cerebrovascular accident (CVA) due to thrombosis of precerebral artery (HCC) [I63.00]                   Date / Time: 5/22/2024 12:18 PM    Patient Admission Status: Inpatient   Readmission Risk (Low < 19, Mod (19-27), High > 27): Readmission Risk Score: 11.9    Current PCP: No primary care provider on file.  PCP verified by CM? Yes (Dr. Giuliano Helms [place])    Chart Reviewed: Yes      History Provided by: Significant Other  Patient Orientation: Unable to Assess (groggy)    Patient Cognition: Other (see comment) (aphasic)    Hospitalization in the last 30 days (Readmission):  No    If yes, Readmission Assessment in CM Navigator will be completed.    Advance Directives:      Code Status: Full Code   Patient's Primary Decision Maker is: Legal Next of Kin      Discharge Planning:    Patient lives with: Spouse/Significant Other Type of Home:    Primary Care Giver: Self  Patient Support Systems include: Spouse/Significant Other   Current Financial resources:    Current community resources:    Current services prior to admission:              Current DME:              Type of Home Care services:       ADLS  Prior functional level: Independent in ADLs/IADLs  Current functional level: Assistance with the following:, Mobility    PT AM-PAC: 17 /24  OT AM-PAC: 20 /24    Family can provide assistance at DC: Yes  Would you like Case Management to discuss the discharge plan with any other family members/significant others, and if so, who? Yes ()  Plans to Return to Present Housing: Unknown at present  Other Identified Issues/Barriers to RETURNING to

## 2024-05-23 NOTE — PLAN OF CARE
Problem: Discharge Planning  Goal: Discharge to home or other facility with appropriate resources  5/23/2024 0321 by David Boo RN  Outcome: Progressing  Flowsheets (Taken 5/22/2024 1900)  Discharge to home or other facility with appropriate resources: Identify barriers to discharge with patient and caregiver  5/22/2024 1859 by Jessica Galeano RN  Outcome: Progressing     Problem: Pain  Goal: Verbalizes/displays adequate comfort level or baseline comfort level  5/23/2024 0321 by David Boo RN  Outcome: Progressing  Flowsheets (Taken 5/22/2024 1900)  Verbalizes/displays adequate comfort level or baseline comfort level: Encourage patient to monitor pain and request assistance  5/22/2024 1859 by Jessica Galeano RN  Outcome: Progressing     Problem: Safety - Adult  Goal: Free from fall injury  5/23/2024 0321 by David Boo RN  Outcome: Progressing  Flowsheets (Taken 5/22/2024 1927)  Free From Fall Injury: Instruct family/caregiver on patient safety  5/22/2024 1859 by Jessica Galeano RN  Outcome: Progressing     Problem: ABCDS Injury Assessment  Goal: Absence of physical injury  5/23/2024 0321 by David Boo RN  Outcome: Progressing  Flowsheets (Taken 5/22/2024 1927)  Absence of Physical Injury: Implement safety measures based on patient assessment  5/22/2024 1859 by Jessica Galeano RN  Outcome: Progressing

## 2024-05-23 NOTE — PROGRESS NOTES
Occupational Therapy  Facility/Department: [address] NEURO ICU  Occupational Therapy Initial Assessment    Name: Lilia Portillo  : 1975  MRN: [id number]  Date of Service: 2024    Discharge Recommendations:   Further therapy recommended at discharge.    OT Equipment Recommendations  Equipment Needed: Yes  Mobility Devices: ADL Assistive Devices  ADL Assistive Devices: Shower Chair with back       Patient Diagnosis(es): The encounter diagnosis was Cerebrovascular accident (CVA) due to thrombosis of precerebral artery (HCC).  Past Medical History:  has a past medical history of Acute cerebral infarction (HCC) and Hypertension.  Past Surgical History:  has a past surgical history that includes  section; Tubal ligation; transesophageal echocardiogram (2023); and Insertable Cardiac Monitor (2023).           Assessment   Performance deficits / Impairments: Decreased functional mobility ;Decreased ADL status;Decreased high-level IADLs;Decreased endurance;Decreased strength;Decreased balance;Decreased cognition  Assessment: pt demonstrated above deficits impacting occupational performance. At this time, pt would benefit from 24hr assistance in prior living arrangement in order to maintain safety d/t decreased balance and cognition. pt would benefit from continued acute OT, as well as at discharge in order to increase safety and independence.  Prognosis: Good  Decision Making: Medium Complexity  REQUIRES OT FOLLOW-UP: Yes  Activity Tolerance  Activity Tolerance: Patient limited by pain;Patient Tolerated treatment well        Plan   Occupational Therapy Plan  Times Per Week: 4x/wk     Restrictions  Restrictions/Precautions  Restrictions/Precautions: Fall Risk  Required Braces or Orthoses?: No  Position Activity Restriction  Other position/activity restrictions: TNK given     Subjective   General  Patient assessed for rehabilitation services?: Yes  Family / Caregiver Present: No  General

## 2024-05-23 NOTE — PROGRESS NOTES
Physical Therapy  Facility/Department: [address] NEURO ICU  Physical Therapy Initial Assessment    Name: Lliia Portillo  : 1975  MRN: [id number]  Date of Service: 2024  Chief Complaint   Patient presents with    Cerebrovascular Accident      Discharge Recommendations:  Patient would benefit from continued therapy after discharge   PT Equipment Recommendations  Equipment Needed: No      Patient Diagnosis(es): The encounter diagnosis was Cerebrovascular accident (CVA) due to thrombosis of precerebral artery (HCC).  Past Medical History:  has a past medical history of Acute cerebral infarction (HCC) and Hypertension.  Past Surgical History:  has a past surgical history that includes  section; Tubal ligation; transesophageal echocardiogram (2023); and Insertable Cardiac Monitor (2023).    Assessment   Body Structures, Functions, Activity Limitations Requiring Skilled Therapeutic Intervention: Decreased functional mobility ;Decreased ADL status;Decreased strength;Decreased safe awareness;Decreased balance;Increased pain;Decreased cognition  Assessment: amb 35' no AD CGAX1, bed mobility/transfers CGAX1. Pt presents with dec balance, strength, functionnal mobility, and dec safety awareness, therefore pt would benefit from continued acute physical therapy.  Therapy Prognosis: Good  Decision Making: Medium Complexity  Requires PT Follow-Up: Yes  Activity Tolerance  Activity Tolerance: Patient limited by pain  Activity Tolerance Comments: Pt reported inc headache upon amb this date and requested to return to bed while wanting to cover her eyes from bright light in hallway.     Plan   Physical Therapy Plan  General Plan: 6-7 times per week  Current Treatment Recommendations: Strengthening, Balance training, Functional mobility training, Transfer training, Pain management, Endurance training, Stair training, Gait training, Home exercise program, Therapeutic activities, Safety education & training,  Evaluation/treatment performed by Student PT under the supervision of co-signing PT who agrees with all evaluation/treatment and documentation.

## 2024-05-23 NOTE — H&P
[facility] Neurology   IN-PATIENT SERVICE   [facility]    HISTORY AND PHYSICAL EXAMINATION            Date:   5/23/2024  Patient name:  Lilia Portillo  Date of admission:  5/22/2024 12:18 PM  MRN:   [id number]  Account:  [id number]  YOB: 1975  PCP:    No primary care provider on file.  Room:   [address] [place] [address]  Code Status:    Full Code    Chief Complaint:     Chief Complaint   Patient presents with    Cerebrovascular Accident       History Obtained From:     patient, electronic medical record    History of Present Illness:       49-year-old female with past medical history of right parietal infarction, hypertension noncompliant on medications presents for symptoms of expressive aphasia started on 5/22/2024.  She was also encephalopathic.  Patient noted to have the symptoms while at work, and started abruptly.  Patient was taken to [facility], and underwent evaluation for stroke.  NIH 7.  CT head without IV contrast and CTA were unremarkable.  There is no evidence of LVO or significant vascular stenosis.  She was administered TNKase at 11 AM on 5/22/2024, and subsequently transferred to [facility] for further management.  She is supposed to be on aspirin 81 mg at home, however is questionable if she takes her medications.    Patient was transferred to [facility] neuro ICU for close monitoring.    MRI brain without IV contrast done on 5/23/2024 shows large left parietotemporal infarction.  Stability CT head without IV contrast shows evolving left parietal temporal infarction.  Patient was previously being followed by hematology for elevated homocystine and concern for hypercoagulable disorder.  Hematology was consulted for evaluation of hypercoagulable state.  She was resumed on aspirin 81 mg and Lipitor 80 mg.    Patient was seen and examined at bedside, continues to be aphasic.  She has moderate to severe aphasia, and moderate dysarthria.  She has no other notable  hours.  PT/OT/SLP    Consultations:   IP CONSULT TO NEUROCRITICAL CARE  IP CONSULT TO HEM/ONC      Follow-up further recommendations after discussing the case with attending  The plan was discussed with the patient, patient's family and the medical staff.     Patient is admitted as inpatient status because of co-morbidities listed above, severity of signs and symptoms as outlined, requirement for current medical therapies and most importantly because of direct risk to patient if care not provided in a hospital setting.    Wayne Carrasco DO  Neurology Resident PGY-3  5/23/2024  3:32 PM    Copy sent to Dr. Workman primary care provider on file.

## 2024-05-23 NOTE — PROGRESS NOTES
Daily Progress Note  Neuro Critical Care    Patient Name: Lilia Portillo  Patient : 1975  Room/Bed: 0123/0123-01  Code Status: Full  Allergies: No Known Allergies    CHIEF COMPLAINT:      Aphasia      INTERVAL HISTORY    Initial Presentation (Admitted 2024):  The patient is a 49 y.o. female with a history of previous ischemic stroke (), hypertension, previous smoker who presented as a transfer from [facility] where she was seen for acute onset aphasia and altered mental status.  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  Patient was evaluated via telestroke at the [facility] emergency department, unknown initial NIH, CT head negative for acute intracranial abnormality, CTA negative for LVO or significant flow-limiting stenosis.  Patient was then given TNK at the [facility] emergency department and transferred to [facility] for further management.  On arrival patient has continued expressive aphasia, NIH 7.  Of note per patient's  she was supposed to be taking aspirin and antihypertensive medications however has had issues with noncompliance to medications in the past and has not been taking her medications for unknown time period.       Hospital Course:   : As above     Interval Events:   No acute events overnight.  MRI brain shows large acute infarct involving the mid to posterior left MCA territory.  On exam patient has some improvement in expressive aphasia, able to verbalize her name and her children's names, otherwise no significant focal neurological deficit noted.  Hypercoag workup pending, will consult hematology oncology.  Plan for CT head 24 hours post TNK, will likely start antiplatelet therapy if no hemorrhage on CT.    CURRENT MEDICATIONS:  SCHEDULED MEDICATIONS:    (Moderate Risk)  []?40 Obese class 3 (High Risk)    RECENT LABS:   Lab Results   Component Value Date    WBC 7.2 05/23/2024    HGB 8.9 (L) 05/23/2024    HCT 32.1 (L) 05/23/2024     05/23/2024    CHOL 178 05/23/2024    TRIG 157 (H) 05/23/2024    HDL 33 (L) 05/23/2024    ALT 12 06/24/2023    AST 17 06/24/2023     05/23/2024    K 4.0 05/23/2024     05/23/2024    CREATININE 0.9 05/23/2024    BUN 8 05/23/2024    CO2 21 05/23/2024    TSH 29.21 (H) 06/24/2023    INR 0.9 05/22/2024    LABA1C 5.7 06/24/2023     24 HOUR INTAKE/OUTPUT:  Intake/Output Summary (Last 24 hours) at 5/23/2024 0734  Last data filed at 5/23/2024 0055  Gross per 24 hour   Intake --   Output 800 ml   Net -800 ml       IMAGING:   CT HEAD WO CONTRAST   Final Result   No acute intracranial abnormality.         CT head without contrast    (Results Pending)   MRI brain without contrast    (Results Pending)       Labs and Images reviewed with:  [x] Dr. Annetta Conrad  [] Dr. Raghavendra Yi  [] Dr. Tarun Zhao  [] There are no new interval images to review.     PHYSICAL EXAM       CONSTITUTIONAL:  Well developed, well nourished, no acute distress, alert, severe expressive aphasia, follows commands   HEAD:  normocephalic, atraumatic    EYES:  PERRLA, EOMI.   ENT:  moist mucous membranes   LUNGS:  Equal air entry bilaterally   CARDIOVASCULAR:  normal s1 / s2   ABDOMEN:  Soft, no rigidity   NECK supple, symmetric, no midline tenderness to palpation    BACK without midline tenderness, step-offs or deformities    EXTREMITIES Normal ROM with no deformities   NEUROLOGIC:  Mental Status:  alert, severe expressive aphasia, follows commands             Cranial Nerves:    cranial nerves II-XII are grossly intact     Motor Exam:    Drift:  absent  Tone:  normal     Motor exam is symmetrical 5 out of 5 all extremities bilaterally     Sensory:    Touch:    Right Upper Extremity:  normal  Left Upper Extremity:  normal  Right Lower Extremity:  normal  Left

## 2024-05-23 NOTE — PROGRESS NOTES
[facility]  Speech Language Pathology    Date: 5/23/2024  Patient Name: Lilia Portillo  YOB: 1975   AGE: 49 y.o.  MRN: [id number]        Patient Not Available for Speech Therapy     Due to:  [] Testing  [] Hemodialysis  [] Cancelled by RN  [] Surgery   [] Intubation/Sedation/Pain Medication  [] Medical instability  [x] Other: patient asked ST to return in PM due to headache on first attempt. Pt in testing on second attempt.     Next scheduled treatment: 5/24    Completed by: Maribell Barnes  Clinician  Cosigned By: Belkis Cotto M.S.CCC/SLP

## 2024-05-24 ENCOUNTER — APPOINTMENT (OUTPATIENT)
Age: 49
End: 2024-05-24
Payer: OTHER GOVERNMENT

## 2024-05-24 ENCOUNTER — OFFICE VISIT (OUTPATIENT)
Dept: OPERATING ROOM | Age: 49
End: 2024-05-24
Attending: INTERNAL MEDICINE

## 2024-05-24 LAB
ANION GAP SERPL CALCULATED.3IONS-SCNC: 12 MMOL/L (ref 9–16)
B2 GLYCOPROT1 IGA SERPL IA-ACNC: 0.5 ELISA U/ML (ref 0–7)
B2 GLYCOPROT1 IGG SERPL IA-ACNC: 1.2 ELISA U/ML (ref 0–7)
B2 GLYCOPROT1 IGM SERPL IA-ACNC: <0.9 ELISA U/ML (ref 0–7)
BUN SERPL-MCNC: 7 MG/DL (ref 6–20)
CA-I BLD-SCNC: 1.1 MMOL/L (ref 1.13–1.33)
CALCIUM SERPL-MCNC: 8.2 MG/DL (ref 8.6–10.4)
CHLORIDE SERPL-SCNC: 105 MMOL/L (ref 98–107)
CO2 SERPL-SCNC: 20 MMOL/L (ref 20–31)
CREAT SERPL-MCNC: 0.9 MG/DL (ref 0.5–0.9)
ECHO AO ROOT DIAM: 2.8 CM
ECHO AO ROOT INDEX: 1.57 CM/M2
ECHO AV AREA PEAK VELOCITY: 2 CM2
ECHO AV AREA VTI: 2 CM2
ECHO AV AREA/BSA PEAK VELOCITY: 1.1 CM2/M2
ECHO AV AREA/BSA VTI: 1.1 CM2/M2
ECHO AV MEAN GRADIENT: 4 MMHG
ECHO AV MEAN VELOCITY: 0.9 M/S
ECHO AV PEAK GRADIENT: 8 MMHG
ECHO AV PEAK VELOCITY: 1.4 M/S
ECHO AV VELOCITY RATIO: 0.71
ECHO AV VTI: 30.4 CM
ECHO BSA: 1.81 M2
ECHO EST RA PRESSURE: 3 MMHG
ECHO LA AREA 2C: 20.4 CM2
ECHO LA AREA 4C: 17.2 CM2
ECHO LA DIAMETER INDEX: 1.91 CM/M2
ECHO LA DIAMETER: 3.4 CM
ECHO LA MAJOR AXIS: 5.3 CM
ECHO LA MINOR AXIS: 4.9 CM
ECHO LA TO AORTIC ROOT RATIO: 1.21
ECHO LA VOL BP: 56 ML (ref 22–52)
ECHO LA VOL MOD A2C: 67 ML (ref 22–52)
ECHO LA VOL MOD A4C: 45 ML (ref 22–52)
ECHO LA VOL/BSA BIPLANE: 31 ML/M2 (ref 16–34)
ECHO LA VOLUME INDEX MOD A2C: 38 ML/M2 (ref 16–34)
ECHO LA VOLUME INDEX MOD A4C: 25 ML/M2 (ref 16–34)
ECHO LV E' LATERAL VELOCITY: 8 CM/S
ECHO LV E' SEPTAL VELOCITY: 6 CM/S
ECHO LV EDV A2C: 62 ML
ECHO LV EDV A4C: 58 ML
ECHO LV EDV INDEX A4C: 33 ML/M2
ECHO LV EDV NDEX A2C: 35 ML/M2
ECHO LV EJECTION FRACTION A2C: 65 %
ECHO LV EJECTION FRACTION A4C: 57 %
ECHO LV EJECTION FRACTION BIPLANE: 61 % (ref 55–100)
ECHO LV ESV A2C: 22 ML
ECHO LV ESV A4C: 25 ML
ECHO LV ESV INDEX A2C: 12 ML/M2
ECHO LV ESV INDEX A4C: 14 ML/M2
ECHO LV FRACTIONAL SHORTENING: 34 % (ref 28–44)
ECHO LV INTERNAL DIMENSION DIASTOLE INDEX: 2.47 CM/M2
ECHO LV INTERNAL DIMENSION DIASTOLIC: 4.4 CM (ref 3.9–5.3)
ECHO LV INTERNAL DIMENSION SYSTOLIC INDEX: 1.63 CM/M2
ECHO LV INTERNAL DIMENSION SYSTOLIC: 2.9 CM
ECHO LV IVSD: 0.9 CM (ref 0.6–0.9)
ECHO LV MASS 2D: 128 G (ref 67–162)
ECHO LV MASS INDEX 2D: 71.9 G/M2 (ref 43–95)
ECHO LV POSTERIOR WALL DIASTOLIC: 0.9 CM (ref 0.6–0.9)
ECHO LV RELATIVE WALL THICKNESS RATIO: 0.41
ECHO LVOT AREA: 2.8 CM2
ECHO LVOT AV VTI INDEX: 0.7
ECHO LVOT DIAM: 1.9 CM
ECHO LVOT MEAN GRADIENT: 2 MMHG
ECHO LVOT PEAK GRADIENT: 4 MMHG
ECHO LVOT PEAK VELOCITY: 1 M/S
ECHO LVOT STROKE VOLUME INDEX: 34.1 ML/M2
ECHO LVOT SV: 60.6 ML
ECHO LVOT VTI: 21.4 CM
ECHO MV A VELOCITY: 0.9 M/S
ECHO MV AREA VTI: 1.9 CM2
ECHO MV E DECELERATION TIME (DT): 261 MS
ECHO MV E VELOCITY: 0.9 M/S
ECHO MV E/A RATIO: 1
ECHO MV E/E' LATERAL: 11.25
ECHO MV E/E' RATIO (AVERAGED): 13.13
ECHO MV E/E' SEPTAL: 15
ECHO MV LVOT VTI INDEX: 1.5
ECHO MV MAX VELOCITY: 1.1 M/S
ECHO MV MEAN GRADIENT: 2 MMHG
ECHO MV MEAN VELOCITY: 0.8 M/S
ECHO MV PEAK GRADIENT: 5 MMHG
ECHO MV VTI: 32.1 CM
ECHO PV MAX VELOCITY: 0.8 M/S
ECHO PV PEAK GRADIENT: 2 MMHG
ECHO RIGHT VENTRICULAR SYSTOLIC PRESSURE (RVSP): 19 MMHG
ECHO RV BASAL DIMENSION: 3 CM
ECHO RV FREE WALL PEAK S': 9 CM/S
ECHO RV TAPSE: 1.7 CM (ref 1.7–?)
ECHO TV REGURGITANT MAX VELOCITY: 2.02 M/S
ECHO TV REGURGITANT PEAK GRADIENT: 16 MMHG
ERYTHROCYTE [DISTWIDTH] IN BLOOD BY AUTOMATED COUNT: 18.7 % (ref 11.8–14.4)
FERRITIN SERPL-MCNC: 7 NG/ML (ref 13–150)
FOLATE SERPL-MCNC: 16 NG/ML (ref 4.8–24.2)
GFR, ESTIMATED: 76 ML/MIN/1.73M2
GLUCOSE SERPL-MCNC: 103 MG/DL (ref 74–99)
HCT VFR BLD AUTO: 34 % (ref 36.3–47.1)
HGB BLD-MCNC: 9.1 G/DL (ref 11.9–15.1)
IMM RETICS NFR: 29.1 % (ref 2.7–18.3)
IRON SATN MFR SERPL: 4 % (ref 20–55)
IRON SERPL-MCNC: 16 UG/DL (ref 37–145)
MCH RBC QN AUTO: 18.2 PG (ref 25.2–33.5)
MCHC RBC AUTO-ENTMCNC: 26.8 G/DL (ref 28.4–34.8)
MCV RBC AUTO: 67.9 FL (ref 82.6–102.9)
NRBC BLD-RTO: 0 PER 100 WBC
PLATELET # BLD AUTO: 290 K/UL (ref 138–453)
PMV BLD AUTO: 8.3 FL (ref 8.1–13.5)
POTASSIUM SERPL-SCNC: 4 MMOL/L (ref 3.7–5.3)
RBC # BLD AUTO: 5.01 M/UL (ref 3.95–5.11)
RETIC HEMOGLOBIN: 20.9 PG (ref 28.2–35.7)
RETICS # AUTO: 0.08 M/UL (ref 0.03–0.08)
RETICS/RBC NFR AUTO: 1.6 % (ref 0.5–1.9)
SODIUM SERPL-SCNC: 137 MMOL/L (ref 136–145)
TIBC SERPL-MCNC: 364 UG/DL (ref 250–450)
UNSATURATED IRON BINDING CAPACITY: 348 UG/DL (ref 112–347)
VIT B12 SERPL-MCNC: 610 PG/ML (ref 232–1245)
WBC OTHER # BLD: 5.4 K/UL (ref 3.5–11.3)

## 2024-05-24 PROCEDURE — 6370000000 HC RX 637 (ALT 250 FOR IP)

## 2024-05-24 PROCEDURE — 82607 VITAMIN B-12: CPT

## 2024-05-24 PROCEDURE — B246ZZ4 ULTRASONOGRAPHY OF RIGHT AND LEFT HEART, TRANSESOPHAGEAL: ICD-10-PCS | Performed by: INTERNAL MEDICINE

## 2024-05-24 PROCEDURE — 82746 ASSAY OF FOLIC ACID SERUM: CPT

## 2024-05-24 PROCEDURE — 99254 IP/OBS CNSLTJ NEW/EST MOD 60: CPT | Performed by: PHYSICAL MEDICINE & REHABILITATION

## 2024-05-24 PROCEDURE — 85027 COMPLETE CBC AUTOMATED: CPT

## 2024-05-24 PROCEDURE — 6370000000 HC RX 637 (ALT 250 FOR IP): Performed by: INTERNAL MEDICINE

## 2024-05-24 PROCEDURE — 83540 ASSAY OF IRON: CPT

## 2024-05-24 PROCEDURE — 93312 ECHO TRANSESOPHAGEAL: CPT

## 2024-05-24 PROCEDURE — 99232 SBSQ HOSP IP/OBS MODERATE 35: CPT | Performed by: PSYCHIATRY & NEUROLOGY

## 2024-05-24 PROCEDURE — 99233 SBSQ HOSP IP/OBS HIGH 50: CPT

## 2024-05-24 PROCEDURE — 93325 DOPPLER ECHO COLOR FLOW MAPG: CPT | Performed by: INTERNAL MEDICINE

## 2024-05-24 PROCEDURE — 93312 ECHO TRANSESOPHAGEAL: CPT | Performed by: INTERNAL MEDICINE

## 2024-05-24 PROCEDURE — 85045 AUTOMATED RETICULOCYTE COUNT: CPT

## 2024-05-24 PROCEDURE — 2580000003 HC RX 258

## 2024-05-24 PROCEDURE — 82330 ASSAY OF CALCIUM: CPT

## 2024-05-24 PROCEDURE — 2060000000 HC ICU INTERMEDIATE R&B

## 2024-05-24 PROCEDURE — 80048 BASIC METABOLIC PNL TOTAL CA: CPT

## 2024-05-24 PROCEDURE — 36415 COLL VENOUS BLD VENIPUNCTURE: CPT

## 2024-05-24 PROCEDURE — 92523 SPEECH SOUND LANG COMPREHEN: CPT

## 2024-05-24 PROCEDURE — 82728 ASSAY OF FERRITIN: CPT

## 2024-05-24 PROCEDURE — 99232 SBSQ HOSP IP/OBS MODERATE 35: CPT | Performed by: INTERNAL MEDICINE

## 2024-05-24 PROCEDURE — 6360000002 HC RX W HCPCS

## 2024-05-24 PROCEDURE — 83550 IRON BINDING TEST: CPT

## 2024-05-24 PROCEDURE — 6360000002 HC RX W HCPCS: Performed by: INTERNAL MEDICINE

## 2024-05-24 RX ORDER — MIDAZOLAM HYDROCHLORIDE 1 MG/ML
INJECTION INTRAMUSCULAR; INTRAVENOUS PRN
Status: COMPLETED | OUTPATIENT
Start: 2024-05-24 | End: 2024-05-24

## 2024-05-24 RX ORDER — FENTANYL CITRATE 50 UG/ML
INJECTION, SOLUTION INTRAMUSCULAR; INTRAVENOUS PRN
Status: COMPLETED | OUTPATIENT
Start: 2024-05-24 | End: 2024-05-24

## 2024-05-24 RX ADMIN — FOLIC ACID 1 MG: 1 TABLET ORAL at 20:31

## 2024-05-24 RX ADMIN — BENZOCAINE, BUTAMBEN, AND TETRACAINE HYDROCHLORIDE 1 SPRAY: .028; .004; .004 AEROSOL, SPRAY TOPICAL at 13:49

## 2024-05-24 RX ADMIN — ASPIRIN 81 MG 81 MG: 81 TABLET ORAL at 10:06

## 2024-05-24 RX ADMIN — SODIUM CHLORIDE, PRESERVATIVE FREE 10 ML: 5 INJECTION INTRAVENOUS at 10:10

## 2024-05-24 RX ADMIN — FOLIC ACID 1 MG: 1 TABLET ORAL at 10:07

## 2024-05-24 RX ADMIN — RIVAROXABAN 2.5 MG: 2.5 TABLET, FILM COATED ORAL at 12:37

## 2024-05-24 RX ADMIN — LEVOTHYROXINE SODIUM 50 MCG: 50 TABLET ORAL at 06:05

## 2024-05-24 RX ADMIN — ENOXAPARIN SODIUM 40 MG: 100 INJECTION SUBCUTANEOUS at 10:07

## 2024-05-24 RX ADMIN — MIDAZOLAM 2 MG: 1 INJECTION INTRAMUSCULAR; INTRAVENOUS at 13:51

## 2024-05-24 RX ADMIN — ACETAMINOPHEN 650 MG: 325 TABLET ORAL at 10:06

## 2024-05-24 RX ADMIN — ACETAMINOPHEN 650 MG: 325 TABLET ORAL at 06:04

## 2024-05-24 RX ADMIN — ATORVASTATIN CALCIUM 80 MG: 80 TABLET, FILM COATED ORAL at 20:31

## 2024-05-24 RX ADMIN — FENTANYL CITRATE 50 MCG: 50 INJECTION, SOLUTION INTRAMUSCULAR; INTRAVENOUS at 13:50

## 2024-05-24 RX ADMIN — SODIUM CHLORIDE, PRESERVATIVE FREE 10 ML: 5 INJECTION INTRAVENOUS at 20:31

## 2024-05-24 RX ADMIN — RIVAROXABAN 2.5 MG: 2.5 TABLET, FILM COATED ORAL at 20:33

## 2024-05-24 ASSESSMENT — ENCOUNTER SYMPTOMS
NAUSEA: 0
VOICE CHANGE: 0
COLOR CHANGE: 0
WHEEZING: 0
SHORTNESS OF BREATH: 0
TROUBLE SWALLOWING: 0
VOMITING: 0

## 2024-05-24 ASSESSMENT — PAIN DESCRIPTION - LOCATION: LOCATION: HEAD

## 2024-05-24 ASSESSMENT — PAIN SCALES - GENERAL
PAINLEVEL_OUTOF10: 0
PAINLEVEL_OUTOF10: 0

## 2024-05-24 ASSESSMENT — PAIN SCALES - WONG BAKER: WONGBAKER_NUMERICALRESPONSE: NO HURT

## 2024-05-24 NOTE — PLAN OF CARE
Problem: Discharge Planning  Goal: Discharge to home or other facility with appropriate resources  5/24/2024 1610 by Pablo Dejesus RN  Outcome: Progressing  5/24/2024 0445 by David Boo RN  Outcome: Progressing  Flowsheets  Taken 5/24/2024 0300  Discharge to home or other facility with appropriate resources: Identify barriers to discharge with patient and caregiver  Taken 5/23/2024 2300  Discharge to home or other facility with appropriate resources: Identify barriers to discharge with patient and caregiver  Taken 5/23/2024 1900  Discharge to home or other facility with appropriate resources: Identify barriers to discharge with patient and caregiver     Problem: Pain  Goal: Verbalizes/displays adequate comfort level or baseline comfort level  5/24/2024 1610 by Pablo Dejesus RN  Outcome: Progressing  5/24/2024 0445 by David Boo RN  Outcome: Progressing  Flowsheets  Taken 5/24/2024 0330  Verbalizes/displays adequate comfort level or baseline comfort level: Encourage patient to monitor pain and request assistance  Taken 5/24/2024 0100  Verbalizes/displays adequate comfort level or baseline comfort level: Encourage patient to monitor pain and request assistance  Taken 5/23/2024 1900  Verbalizes/displays adequate comfort level or baseline comfort level: Encourage patient to monitor pain and request assistance     Problem: Safety - Adult  Goal: Free from fall injury  5/24/2024 1610 by Pablo Dejesus RN  Outcome: Progressing  5/24/2024 0445 by David Boo, RN  Outcome: Progressing  Flowsheets (Taken 5/23/2024 1941)  Free From Fall Injury: Instruct family/caregiver on patient safety     Problem: ABCDS Injury Assessment  Goal: Absence of physical injury  5/24/2024 1610 by Pablo Dejesus RN  Outcome: Progressing  5/24/2024 0445 by David Boo, RN  Outcome: Progressing  Flowsheets (Taken 5/23/2024 1941)  Absence of Physical Injury: Implement safety measures based on patient  assessment     Problem: Chronic Conditions and Co-morbidities  Goal: Patient's chronic conditions and co-morbidity symptoms are monitored and maintained or improved  5/24/2024 1610 by Pablo Dejesus, RN  Outcome: Progressing  5/24/2024 0445 by David Boo, RN  Outcome: Progressing  Flowsheets  Taken 5/24/2024 0300  Care Plan - Patient's Chronic Conditions and Co-Morbidity Symptoms are Monitored and Maintained or Improved: Monitor and assess patient's chronic conditions and comorbid symptoms for stability, deterioration, or improvement  Taken 5/23/2024 2300  Care Plan - Patient's Chronic Conditions and Co-Morbidity Symptoms are Monitored and Maintained or Improved: Monitor and assess patient's chronic conditions and comorbid symptoms for stability, deterioration, or improvement  Taken 5/23/2024 1900  Care Plan - Patient's Chronic Conditions and Co-Morbidity Symptoms are Monitored and Maintained or Improved: Monitor and assess patient's chronic conditions and comorbid symptoms for stability, deterioration, or improvement

## 2024-05-24 NOTE — CONSULTS
Physical Medicine & Rehabilitation  Consult Note      Admitting Physician: Keri Overton MD    Primary Care Provider: No primary care provider on file.     Reason for Consult:  Acute Inpatient Rehabilitation    Chief Complaint: Expressive aphasia    History of Present Illness:  Referring Provider is requesting an evaluation for appropriate placement upon discharge from acute care.     Ms. Lilia Portillo is a 49 y.o.  female who was admitted to [facility] on 5/22/2024 with Cerebrovascular Accident    49-year-old female with history of right parietal infarct, hypertension, noncompliant on medication presents with expressive aphasia*5/22/2024.  She is also encephalopathic.  NIH was 7.  CT head without IV contrast and CTA were unremarkable.  No evidence of LVO.  She was given TNK and transferred to [facility].  CT head without IV contrast showed evolving left parietal temporal infarct.  She was being followed by hematology for elevated homocystine and concern for hypercoagulable disorder.  She was resumed on aspirin and Lipitor    Cardiology plan for EDMUNDO for embolic source being completed today 5/24    Hematology ischemic stroke, microcytic anemia-history of ischemic stroke without neurologic deficit 1 year ago has not been compliant with aspirin or antiplatelet agent large mid to posterior left MCA CVA with hemorrhagic without hemorrhagic transformation continues on hypercoagulable workup, currently recommending dual antiplatelet agent.  Will reevaluate depending on results of hypercoagulable workup.    Neurocritical-expressive aphasia without other focal deficits checking TSH, currently no chemoprophylaxis    Neuro continue aspirin Lipitor, TTE, evaluate for embolic source.        Radiology:  CT head without contrast    Result Date: 5/23/2024  Evolving subacute left parietal/temporal lobe infarct. Small remote right parietal/temporal lobe infarct.     MRI brain without contrast    Result Date: 5/23/2024  1.

## 2024-05-24 NOTE — PROGRESS NOTES
TRANSFER - OUT REPORT:    Verbal report given to VICTORIA Shah on Lilia Portillo being transferred to  for routine post-op       Report consisted of patient's Situation, Background, Assessment and   Recommendations(SBAR).     Information from the following report(s) Event Log was reviewed with the receiving nurse.    Opportunity for questions and clarification was provided.      Patient transported with:   Registered Nurse

## 2024-05-24 NOTE — CARE COORDINATION
Met with pt to assess for support and complete PHQ-9 screen.  Husb present when SW entered but then left the room. Pt stated she lives in [place] with her husb and 4 children. Stated she has 3 sons and 1 dtr. Pt stated her parents and siblings live in the area and her in-laws are in [place]. Pt stated family is supportive and help out as needed. Pt denies any recent feelings of depression/SI.  Patient Health Questionnaire-9 (PHQ-9)    Over the last 2 weeks, how often have you been bothered by any of the following problems?    1. Little Interest or pleasure in doing things?   [x] Not at all  [] Several Days  [] More than half the day  []  Nearly every day    2. Feeling down, depressed or hopeless?    [x] Not at all  [] Several Days  [] More than half the day  []  Nearly every day    3. Trouble falling or staying asleep, or sleeping too much?   [x] Not at all  [] Several Days  [] More than half the day  []  Nearly every day    4. Feeling tired or having little energy?   [x] Not at all  [] Several Days  [] More than half the day  []  Nearly every day    5. Poor apettite or overeating?   [x] Not at all  [] Several Days  [] More than half the day  []  Nearly every day    6. Feeling bad about yourself-or that you are a failure or have let yourself or your family down?   [x] Not at all  [] Several Days  [] More than half the day  []  Nearly every day    7. Trouble concentrating on things, such as reading the newspaper or watching television?   [x] Not at all  [] Several Days  [] More than half the day  []  Nearly every day    8. Moving or speaking so slowly that other people could have noticed? Or the opposite-being so fidgety or restless that you have been moving around a lot more than usual?   [x] Not at all  [] Several Days  [] More than half the day  []  Nearly every day    9. Thoughts that you would be better off dead or of hurting yourself in some way?   [x] Not at all  [] Several Days  [] More than half the

## 2024-05-24 NOTE — PROGRESS NOTES
SLP ALL NOTES  Facility/Department: [address] NEURO ICU  Initial Speech/Language/Cognitive Assessment    NAME: Lilia Portillo  : 1975   MRN: [id number]  ADMISSION DATE: 2024  ADMITTING DIAGNOSIS: has TIA (transient ischemic attack); Primary hypertension; Left sided numbness; Cerebrovascular accident (CVA) due to thrombosis of precerebral artery (HCC); Non Q wave myocardial infarction (HCC); and Ischemic stroke (HCC) on their problem list.      Date of Eval: 2024   Evaluating Therapist: Maribell Barnes    RECENT RESULTS  CT OF HEAD/MRI:   CT : IMPRESSION:  Evolving subacute left parietal/temporal lobe infarct.  Small remote right parietal/temporal lobe infarct.    MRI :IMPRESSION:  1. Large acute infarct involving the mid to posterior left MCA territory.  Sulcal effacement without significant mass effect or midline shift.  No  evidence of hemorrhagic transformation.  2. Otherwise, no acute intracranial abnormality.  3. Small chronic infarct involving the right parietal lobe.  These results were sent to the CORE Team on 2024 at 8:05 am to be  communicated to the referring/covering health care provider/office.    Primary Complaint: The patient is a 49 y.o. female with a history of previous ischemic stroke (), hypertension, previous smoker who presented as a transfer from [facility] where she was seen for acute onset aphasia and altered mental status.  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  Patient was evaluated via telestroke at the [facility] emergency department, unknown initial NIH, CT head negative for acute intracranial abnormality, CTA negative for LVO or significant flow-limiting stenosis.  Patient was then given TNK at the [facility] emergency department and transferred to  errors;Abnormal prosodic features  Dysarthric Characteristics: None    Auditory Comprehension  Comprehension: Exceptions  Simple Yes/No Questions: WFL  Complex Yes/No Questions: Mild (7/8)  One Step Commands: WFL  Two Step Commands: Mild (2/2, 0/2, 2/2, 2/2)  Multistep Commands: Moderate (1/3, notably frustrated)  Common Objects: WFL       Expression  Primary Mode of Expression: Verbal    Verbal Expression  Verbal Expression: Exceptions to functional limits  Repetition: Moderate-Severe (words 1/5, sentences 1/2; aware of phonemic paraphasias that may be related to apraxia)  Automatic Speech: Severe (could count 1-10 but could not list months of the year with max cues)  Confrontation: WFL (3/3)  Responsive: Severe (NR, pt shook her head in frustration after each of four questions)  Interfering Components: Paraphasia                            Prognosis:  Speech Therapy Prognosis  Prognosis: Fair  Individuals consulted  Consulted and agree with results and recommendations: Patient;Family member    Education:  Patient Education: yes  Patient Education Response: Demonstrated understanding          Therapy Time:   Individual Concurrent Group Co-treatment   Time In  1040         Time Out  1058         Minutes  18                 Completed by: Maribell Barnes  Clinician  Cosigned By: Belkis Cotto M.S.CCC/SLP

## 2024-05-24 NOTE — CONSULTS
[facility]    Consult                        Today's Date: 5/24/2024  Patient Name: Lilia Portillo  Date of admission: 5/22/2024 12:18 PM  Patient's age: 49 y.o., 1975  Admission Dx: Ischemic stroke (HCC) [I63.9]  Cerebrovascular accident (CVA) due to thrombosis of precerebral artery (HCC) [I63.00]    Reason for Consult:  Cardiac evaluation    Requesting Physician: Keri Overton MD    CHIEF COMPLAINT:  expressive aphasia    History Obtained From:  patient, electronic medical record    HISTORY OF PRESENT ILLNESS:      \"49-year-old female with past medical history of right parietal infarction, hypertension noncompliant on medications presents for symptoms of expressive aphasia started on 5/22/2024.  She was also encephalopathic.  Patient noted to have the symptoms while at work, and started abruptly.  Patient was taken to [facility], and underwent evaluation for stroke.  NIH 7.  CT head without IV contrast and CTA were unremarkable.  There is no evidence of LVO or significant vascular stenosis.  She was administered TNKase at 11 AM on 5/22/2024, and subsequently transferred to [facility] for further management.  She is supposed to be on aspirin 81 mg at home, however is questionable if she takes her medications.     Patient was transferred to [facility] neuro ICU for close monitoring.     MRI brain without IV contrast done on 5/23/2024 shows large left parietotemporal infarction.  Stability CT head without IV contrast shows evolving left parietal temporal infarction.  Patient was previously being followed by hematology for elevated homocystine and concern for hypercoagulable disorder.  Hematology was consulted for evaluation of hypercoagulable state.  She was resumed on aspirin 81 mg and Lipitor 80 mg.\" per prior documentation from primary team       Cardiology consulted - Neurology requesting EDMUNDO for evaluation of embolic source     Past Medical History:   has a past

## 2024-05-24 NOTE — PLAN OF CARE
Problem: Discharge Planning  Goal: Discharge to home or other facility with appropriate resources  Outcome: Progressing  Flowsheets  Taken 5/24/2024 0300  Discharge to home or other facility with appropriate resources: Identify barriers to discharge with patient and caregiver  Taken 5/23/2024 2300  Discharge to home or other facility with appropriate resources: Identify barriers to discharge with patient and caregiver  Taken 5/23/2024 1900  Discharge to home or other facility with appropriate resources: Identify barriers to discharge with patient and caregiver     Problem: Pain  Goal: Verbalizes/displays adequate comfort level or baseline comfort level  Outcome: Progressing  Flowsheets  Taken 5/24/2024 0330  Verbalizes/displays adequate comfort level or baseline comfort level: Encourage patient to monitor pain and request assistance  Taken 5/24/2024 0100  Verbalizes/displays adequate comfort level or baseline comfort level: Encourage patient to monitor pain and request assistance  Taken 5/23/2024 1900  Verbalizes/displays adequate comfort level or baseline comfort level: Encourage patient to monitor pain and request assistance     Problem: Safety - Adult  Goal: Free from fall injury  Outcome: Progressing  Flowsheets (Taken 5/23/2024 1941)  Free From Fall Injury: Instruct family/caregiver on patient safety     Problem: ABCDS Injury Assessment  Goal: Absence of physical injury  Outcome: Progressing  Flowsheets (Taken 5/23/2024 1941)  Absence of Physical Injury: Implement safety measures based on patient assessment     Problem: Chronic Conditions and Co-morbidities  Goal: Patient's chronic conditions and co-morbidity symptoms are monitored and maintained or improved  Outcome: Progressing  Flowsheets  Taken 5/24/2024 0300  Care Plan - Patient's Chronic Conditions and Co-Morbidity Symptoms are Monitored and Maintained or Improved: Monitor and assess patient's chronic conditions and comorbid symptoms for stability,  deterioration, or improvement  Taken 5/23/2024 2300  Care Plan - Patient's Chronic Conditions and Co-Morbidity Symptoms are Monitored and Maintained or Improved: Monitor and assess patient's chronic conditions and comorbid symptoms for stability, deterioration, or improvement  Taken 5/23/2024 1900  Care Plan - Patient's Chronic Conditions and Co-Morbidity Symptoms are Monitored and Maintained or Improved: Monitor and assess patient's chronic conditions and comorbid symptoms for stability, deterioration, or improvement

## 2024-05-24 NOTE — PROCEDURES
[facility]  Transesophageal Echocardiogram       Today's Date:  5/24/2024  Indication:   R/o embolic source of TIA    Operators:  Primary:   Brina Morgan MD (Attending Physician)  Assistant:   MD Benita (Cardiovascular Fellow)    Pre Procedure Conscious Sedation Data:  ASA Class:    [] I [] II [x] III [] IV    Mallampati Class:  [] I [x] II [] III [] IV    Procedure:  Patient seen and examined. History and Physical reviewed. Labs reviewed.    After informed consent was obtained with explanation of the risks and benefits, the patient was brought to cardiac cath lab. All sedation was administered by the cardiologist. The oropharynx was pre-anesthesized with viscous lidocaine and cetacaine spray. The ultrasound probe was passed without any difficulty.    EDMUNDO findings:  LA:  Normal. Smoke present  SAMIR:  No thrombus  RA:  Normal  RV:   Normal  LV:  Normal  Estimated LVEF:  55%  Aorta:   Mild atheromatous disease arch  Pericardium: No pericardial effusion  Septum:  No intracardiac shunt via color Doppler.  No intracardiac shunt via injection of agitated saline.    Valves:  Mitral Valve: Structurally normal. no regurgitation is identified.  Aortic Valve: The aortic valve is trileaflet and opens adequately. no regurgitiation is identified.  Tricuspid valve: Structurally normal. trivial regurgitation is identified.  Pulmonary valve: Normal. No significant regurgitation    No valvular vegetations or thrombus identified.    Summary:   1. A EDMUNDO was performed without complications.  2. LVEF 55%  3. No thrombus or valvular vegetation identified  4. Smoke present in the LA  5. Normal valves and no cloth in LV or RV       MD Benita.  Fellow, cardiovascular disease   [place], [place]

## 2024-05-24 NOTE — PROGRESS NOTES
Today's Date: 2024  Patient Name: Lilia Portillo  Date of admission: 2024 12:18 PM  Patient's age: 49 y.o., 1975  Admission Dx: Ischemic stroke (HCC) [I63.9]  Cerebrovascular accident (CVA) due to thrombosis of precerebral artery (HCC) [I63.00]    Reason for Consult: Ischemic stroke, hypercoag workup   Requesting Physician: Keri Overton MD    Chief Complaint:  CVA     Interval Changes:  Patient seen and examined.  Labs and vitals reviewed.  Hemoglobin 9.1. Will check anemia panel  Plan for EDMUNDO today  Hypercoagulable lab workup in process  Expressive aphasia continues but seems to be improving    History of Present Illness:    The patient is a 49 y.o. female who is admitted to the hospital for management of ischemic stroke.  She has a past medical history of previous ischemic stroke in 2023, hypertension, previous smoker.  She presented to [facility] with acute aphasia and altered mental status.  She was evaluated by telestroke and given TNK at outlying ED and transferred to [facility] for further care.     Patient's  states that she is fairly noncompliant and is supposed to be on aspirin and antihypertensives.     Hematology oncology has been consulted for hypercoagulable workup.    Past Medical History:   has a past medical history of Acute cerebral infarction (HCC) and Hypertension.    Past Surgical History:   has a past surgical history that includes  section; Tubal ligation; transesophageal echocardiogram (2023); and Insertable Cardiac Monitor (2023).     Medications:    Prior to Admission medications    Medication Sig Start Date End Date Taking? Authorizing Provider   amLODIPine (NORVASC) 5 MG tablet Take 1 tablet by mouth daily 8/15/23   Ivone Llamas, APRN - CNP   ferrous sulfate (IRON 325) 325 (65 Fe) MG tablet Take 1 tablet by mouth 2 times daily 23   Manolo Acuna MD   aspirin 81 MG chewable tablet Take 1 tablet by mouth daily  HISTORY: stroke alert / aphasia / dysarthria TECHNOLOGIST PROVIDED HISTORY: stroke alert / aphasia / dysarthria Decision Support Exception - unselect if not a suspected or confirmed emergency medical condition->Emergency Medical Condition (MA) Is the patient pregnant?->No Reason for Exam: stroke alert / aphasia / dysarthria FINDINGS: BRAIN/VENTRICLES: There is no acute intracranial hemorrhage, mass effect or midline shift.  No abnormal extra-axial fluid collection.  The gray-white differentiation is maintained without evidence of an acute infarct.  There is no evidence of hydrocephalus. Ventricles are normal.  No midline shift. Hyperdensity along the tentorium is smooth and symmetric in appearance. Large amount streak artifact. ORBITS: The visualized portion of the orbits demonstrate no acute abnormality. SINUSES: Mucous retention cysts in the maxillary sinuses.  Diffuse thickening in the ethmoid sinuses.  Mastoid air cells are clear. SOFT TISSUES/SKULL:  No acute abnormality of the visualized skull or soft tissues.     No acute intracranial abnormality.        Impression:   Primary Problem  Ischemic stroke (HCC)    Active Hospital Problems    Diagnosis Date Noted    Ischemic stroke (HCC) [I63.9] 05/22/2024       Assessment:  -Ischemic stroke  -Microcytic Anemia     Plan:  -I reviewed the labs/imaging available to me, outside records and discussed with the patient. I explained the significance of these abnormalities and possible etiology and management options.  -49-year-old female with a previous history of ischemic stroke a year ago without neurological deficit.  She has not been compliant on aspirin or any antiplatelet agent.  She has a previous history of hypertension but she has been taking the blood pressure medication.  Now she comes in with aphasia.  Imaging studies suggest large mid to posterior left MCA territory infarct without hemorrhagic transformation.  -The patient age and lack of major comorbidity

## 2024-05-24 NOTE — CARE COORDINATION
Transitional Planning:  Spoke with Ector at [facility].  She will need a PM&R eval.    11:10  Call from Fozia [phone] at [facility].  They are reviewing.    PS team for PM&R eval.    12:26  Call from Fozia at [facility].  Patient does not have SNF benefits.    16:00  Spoke with Sena at [facility] [phone].  They are reviewing for acceptance.  Patient will need prior auth.    16:22 Spoke with Sena.  [facility] can accept.  Sena will check benefits and get prior auth.  No beds until Tuesday.

## 2024-05-24 NOTE — PROGRESS NOTES
[facility]   IN-PATIENT SERVICE   [facility]    Progress Note             Date:   5/24/2024  Patient name:  Lilia Portillo  Date of admission:  5/22/2024 12:18 PM  MRN:   [id number]  Account:  [id number]  YOB: 1975  PCP:    No primary care provider on file.  Room:   [address] [place] [address]  Code Status:    Full Code    Chief Complaint:     Chief Complaint   Patient presents with    Cerebrovascular Accident       Interval hx:     No acute events overnight.  Vitally stable, SBP goal less than 180.  EDMUNDO completed and shows no significant findings.   Hypercoagulable workup.  Aphasia and dysarthria are improving    Brief History of Present Illness:     49-year-old female with past medical history of right parietal infarction, hypertension noncompliant on medications presents for symptoms of expressive aphasia started on 5/22/2024.  She was also encephalopathic.  Patient noted to have the symptoms while at work, and started abruptly.  Patient was taken to [facility], and underwent evaluation for stroke.  NIH 7.  CT head without IV contrast and CTA were unremarkable.  There is no evidence of LVO or significant vascular stenosis.  She was administered TNKase at 11 AM on 5/22/2024, and subsequently transferred to [facility] for further management.  She is supposed to be on aspirin 81 mg at home, however is questionable if she takes her medications.     Patient was transferred to [place] neuro ICU for close monitoring.     MRI brain without IV contrast done on 5/23/2024 shows large left parietotemporal infarction.  Stability CT head without IV contrast shows evolving left parietal temporal infarction.  Patient was previously being followed by hematology for elevated homocystine and concern for hypercoagulable disorder.  Hematology was consulted for evaluation of hypercoagulable state.  She was resumed on aspirin 81 mg and Lipitor 80 mg.     Patient was seen and

## 2024-05-25 VITALS
RESPIRATION RATE: 25 BRPM | HEIGHT: 64 IN | BODY MASS INDEX: 27.31 KG/M2 | WEIGHT: 160 LBS | HEART RATE: 60 BPM | DIASTOLIC BLOOD PRESSURE: 94 MMHG | SYSTOLIC BLOOD PRESSURE: 165 MMHG | TEMPERATURE: 97.8 F | OXYGEN SATURATION: 98 %

## 2024-05-25 LAB
ANION GAP SERPL CALCULATED.3IONS-SCNC: 10 MMOL/L (ref 9–16)
APCR PPP: 3.94 {RATIO}
BUN SERPL-MCNC: 12 MG/DL (ref 6–20)
CALCIUM SERPL-MCNC: 8.5 MG/DL (ref 8.6–10.4)
CHLORIDE SERPL-SCNC: 105 MMOL/L (ref 98–107)
CO2 SERPL-SCNC: 23 MMOL/L (ref 20–31)
CREAT SERPL-MCNC: 1 MG/DL (ref 0.5–0.9)
ERYTHROCYTE [DISTWIDTH] IN BLOOD BY AUTOMATED COUNT: 19.4 % (ref 11.8–14.4)
GFR, ESTIMATED: 69 ML/MIN/1.73M2
GLUCOSE SERPL-MCNC: 100 MG/DL (ref 74–99)
HCT VFR BLD AUTO: 34.3 % (ref 36.3–47.1)
HGB BLD-MCNC: 9.4 G/DL (ref 11.9–15.1)
MAGNESIUM SERPL-MCNC: 2.3 MG/DL (ref 1.6–2.6)
MCH RBC QN AUTO: 18.3 PG (ref 25.2–33.5)
MCHC RBC AUTO-ENTMCNC: 27.4 G/DL (ref 28.4–34.8)
MCV RBC AUTO: 66.7 FL (ref 82.6–102.9)
NRBC BLD-RTO: 0 PER 100 WBC
PLATELET # BLD AUTO: 288 K/UL (ref 138–453)
PMV BLD AUTO: 8.4 FL (ref 8.1–13.5)
POTASSIUM SERPL-SCNC: 3.5 MMOL/L (ref 3.7–5.3)
PROT C AG ACT/NOR PPP IA: 91 % (ref 63–153)
PROT S AG ACT/NOR PPP IA: 124 % (ref 63–126)
PROT S FREE AG ACT/NOR PPP IA: 125 % (ref 55–123)
RBC # BLD AUTO: 5.14 M/UL (ref 3.95–5.11)
SODIUM SERPL-SCNC: 138 MMOL/L (ref 136–145)
WBC OTHER # BLD: 5.5 K/UL (ref 3.5–11.3)

## 2024-05-25 PROCEDURE — 6370000000 HC RX 637 (ALT 250 FOR IP)

## 2024-05-25 PROCEDURE — 2580000003 HC RX 258

## 2024-05-25 PROCEDURE — 83735 ASSAY OF MAGNESIUM: CPT

## 2024-05-25 PROCEDURE — 36415 COLL VENOUS BLD VENIPUNCTURE: CPT

## 2024-05-25 PROCEDURE — 80048 BASIC METABOLIC PNL TOTAL CA: CPT

## 2024-05-25 PROCEDURE — 99232 SBSQ HOSP IP/OBS MODERATE 35: CPT | Performed by: PSYCHIATRY & NEUROLOGY

## 2024-05-25 PROCEDURE — 85027 COMPLETE CBC AUTOMATED: CPT

## 2024-05-25 RX ORDER — UREA 10 %
325 LOTION (ML) TOPICAL
Qty: 90 TABLET | Refills: 3 | Status: SHIPPED | OUTPATIENT
Start: 2024-05-26

## 2024-05-25 RX ORDER — UREA 10 %
325 LOTION (ML) TOPICAL
Qty: 90 TABLET | Refills: 3 | Status: SHIPPED | OUTPATIENT
Start: 2024-05-26 | End: 2024-05-25

## 2024-05-25 RX ORDER — UREA 10 %
325 LOTION (ML) TOPICAL
Status: DISCONTINUED | OUTPATIENT
Start: 2024-05-26 | End: 2024-05-25 | Stop reason: HOSPADM

## 2024-05-25 RX ORDER — POTASSIUM CHLORIDE 20 MEQ/1
20 TABLET, EXTENDED RELEASE ORAL 2 TIMES DAILY WITH MEALS
Status: DISCONTINUED | OUTPATIENT
Start: 2024-05-25 | End: 2024-05-25 | Stop reason: HOSPADM

## 2024-05-25 RX ORDER — ASPIRIN 81 MG/1
81 TABLET, CHEWABLE ORAL DAILY
Qty: 30 TABLET | Refills: 3 | Status: SHIPPED | OUTPATIENT
Start: 2024-05-26 | End: 2024-05-25

## 2024-05-25 RX ORDER — ASPIRIN 81 MG/1
81 TABLET, CHEWABLE ORAL DAILY
Qty: 30 TABLET | Refills: 3 | Status: SHIPPED | OUTPATIENT
Start: 2024-05-26

## 2024-05-25 RX ADMIN — ACETAMINOPHEN 650 MG: 325 TABLET ORAL at 05:26

## 2024-05-25 RX ADMIN — POTASSIUM CHLORIDE 20 MEQ: 1500 TABLET, EXTENDED RELEASE ORAL at 08:55

## 2024-05-25 RX ADMIN — ASPIRIN 81 MG 81 MG: 81 TABLET ORAL at 08:55

## 2024-05-25 RX ADMIN — LEVOTHYROXINE SODIUM 50 MCG: 50 TABLET ORAL at 05:26

## 2024-05-25 RX ADMIN — SODIUM CHLORIDE, PRESERVATIVE FREE 10 ML: 5 INJECTION INTRAVENOUS at 08:55

## 2024-05-25 RX ADMIN — RIVAROXABAN 2.5 MG: 2.5 TABLET, FILM COATED ORAL at 08:55

## 2024-05-25 RX ADMIN — FOLIC ACID 1 MG: 1 TABLET ORAL at 08:55

## 2024-05-25 ASSESSMENT — ENCOUNTER SYMPTOMS
ABDOMINAL DISTENTION: 0
CHEST TIGHTNESS: 0
WHEEZING: 0
COUGH: 0
NAUSEA: 0
SHORTNESS OF BREATH: 0
ABDOMINAL PAIN: 0
VOMITING: 0
DIARRHEA: 0

## 2024-05-25 ASSESSMENT — PAIN SCALES - GENERAL: PAINLEVEL_OUTOF10: 3

## 2024-05-25 NOTE — PROGRESS NOTES
[facility]   IN-PATIENT SERVICE   [facility]    Progress Note             Date:   5/25/2024  Patient name:  Lilia Portillo  Date of admission:  5/22/2024 12:18 PM  MRN:   [id number]  Account:  [id number]  YOB: 1975  PCP:    No primary care provider on file.  Room:   [address] [place] [address]  Code Status:    Full Code    Chief Complaint:     Chief Complaint   Patient presents with    Cerebrovascular Accident       Interval hx:     The patient was seen and examined at bedside. Is vitally stable, alert and oriented x 3. No acute events overnight.  Ferritin and iron panel low, started on iron replacement. Will contact  about loop recorder interrogation. Pending placement to [facility] rehab.       Brief History of Present Illness:     49-year-old female with past medical history of right parietal infarction, hypertension noncompliant on medications presents for symptoms of expressive aphasia started on 5/22/2024. She was also encephalopathic. Patient noted to have the symptoms while at work, and started abruptly. Patient was taken to [facility], and underwent evaluation for stroke. NIH 7. CT head without IV contrast and CTA were unremarkable. There is no evidence of LVO or significant vascular stenosis. She was administered TNKase at 11 AM on 5/22/2024, and subsequently transferred to [facility] for further management. She is supposed to be on aspirin 81 mg at home, however is questionable if she takes her medications.     Patient was transferred to [place] neuro ICU for close monitoring.     MRI brain without IV contrast done on 5/23/2024 shows large left parietotemporal infarction.  Stability CT head without IV contrast shows evolving left parietal temporal infarction.  Patient was previously being followed by hematology for elevated homocystine and concern for hypercoagulable disorder. Hematology was consulted for evaluation of hypercoagulable state. She was

## 2024-05-25 NOTE — PLAN OF CARE
Problem: Discharge Planning  Goal: Discharge to home or other facility with appropriate resources  Outcome: Completed     Problem: Pain  Goal: Verbalizes/displays adequate comfort level or baseline comfort level  5/25/2024 1538 by Verona Lewis RN  Outcome: Completed  5/25/2024 0259 by Lizbeth Gill RN  Outcome: Progressing     Problem: Safety - Adult  Goal: Free from fall injury  5/25/2024 1538 by Verona Lewis RN  Outcome: Completed  5/25/2024 0259 by Lizbeth Gill RN  Outcome: Progressing     Problem: ABCDS Injury Assessment  Goal: Absence of physical injury  5/25/2024 1538 by Verona Lewis RN  Outcome: Completed  5/25/2024 0259 by Lizbeth Gill RN  Outcome: Progressing     Problem: Chronic Conditions and Co-morbidities  Goal: Patient's chronic conditions and co-morbidity symptoms are monitored and maintained or improved  Outcome: Completed

## 2024-05-25 NOTE — PLAN OF CARE
Problem: Pain  Goal: Verbalizes/displays adequate comfort level or baseline comfort level  5/25/2024 0259 by Lizbeth Gill RN  Outcome: Progressing       Problem: Safety - Adult  Goal: Free from fall injury  5/25/2024 0259 by Lizbeth Gill RN  Outcome: Progressing     Problem: ABCDS Injury Assessment  Goal: Absence of physical injury  5/25/2024 0259 by Lizbeth Gill RN  Outcome: Progressing

## 2024-05-27 LAB
ECHO BSA: 1.81 M2
F2 C.20210G>A GENO BLD/T: NEGATIVE
F2 C.20210G>A GENO BLD/T: NEGATIVE
F5 P.R506Q BLD/T QL: NEGATIVE
SPECIMEN SOURCE: NORMAL

## 2024-05-27 NOTE — DISCHARGE SUMMARY
for COMPASS trial           Lipid panel shows cholesterol 170, HDL 33, , triglycerides 157, VLDL 31.  Hemoglobin A1c 5.5 on 5/23/2024.  TSH on 5/23/2024 was measured at 7.96. Free T4 level was 0.6 on 5/23/2024. (Decreased)     Lupus anticoagulant negative, PT 12.5, INR 0.9.  Homocystine level elevated at 28 micromol/liter on 5/22/2024-previously also elevated at 18.6 on 6/24/2023  Antithrombin III activity increased at 128 on 5/22/2024  Beta-2 glycoprotein level normal   Prothrombin gene mutation ordered and pending  Factor V Leiden mutation ordered and pending            Patient is stable from neurological standpoint to be discharged.    Procedures:  EDMUNDO with ILR     Any Hospital Acquired Infections: none    Discharge Functional Status:  stable    Disposition: home    Patient Instructions:     Continue aspirin 81 mg and Lipitor 80 mg.  Continue Xarelto 2.5 mg BID per COMPASS trial   Loop recorder interrogation negative for arrhythmia  EDMUNDO completed shows normal EF 55%, no thrombus or valvular vegetation, there is \"smoke\" in left atrium which can increase risk of cerebrovascular events   Hypercoagulable workup per hematology/oncology, they recommend DAPT   Lipid panel shows cholesterol 170, HDL 33, , triglycerides 157, VLDL 31.  Hemoglobin A1c 5.5 on 5/23/2024.  TSH on 5/23/2024 was measured at 7.96. Free T4 level was 0.6 on 5/23/2024. (Decreased)  SBP less than 140   Continue iron replacement for iron deficiency anemia      Discharge Medications:  Discharge Medication List as of 5/25/2024  3:01 PM        START taking these medications    Details   rivaroxaban (XARELTO) 2.5 MG TABS tablet Take 1 tablet by mouth 2 times daily, Disp-60 tablet, R-3Normal      ferrous sulfate (FE TABS 325) 325 (65 Fe) MG EC tablet Take 1 tablet by mouth daily (with breakfast), Disp-90 tablet, R-3Normal           CONTINUE these medications which have CHANGED    Details   aspirin 81 MG chewable tablet Take 1 tablet by mouth

## 2024-05-29 LAB
CARDIOLIPIN IGA SER IA-ACNC: 2 APL (ref 0–14)
CARDIOLIPIN IGG SER IA-ACNC: 2.6 GPL (ref 0–10)
CARDIOLIPIN IGM SER IA-ACNC: 2.7 MPL (ref 0–10)

## 2024-05-30 LAB
F5 P.R506Q BLD/T QL: NEGATIVE
SPECIMEN SOURCE: NORMAL

## 2024-06-19 ENCOUNTER — OFFICE VISIT (OUTPATIENT)
Dept: ONCOLOGY | Age: 49
End: 2024-06-19
Payer: OTHER GOVERNMENT

## 2024-06-19 ENCOUNTER — TELEPHONE (OUTPATIENT)
Dept: ONCOLOGY | Age: 49
End: 2024-06-19

## 2024-06-19 VITALS
WEIGHT: 159.8 LBS | HEART RATE: 70 BPM | TEMPERATURE: 96.8 F | RESPIRATION RATE: 18 BRPM | BODY MASS INDEX: 27.43 KG/M2 | OXYGEN SATURATION: 100 % | SYSTOLIC BLOOD PRESSURE: 135 MMHG | DIASTOLIC BLOOD PRESSURE: 81 MMHG

## 2024-06-19 DIAGNOSIS — D64.9 ANEMIA, UNSPECIFIED TYPE: Primary | ICD-10-CM

## 2024-06-19 DIAGNOSIS — E61.1 IRON DEFICIENCY: ICD-10-CM

## 2024-06-19 PROCEDURE — 99214 OFFICE O/P EST MOD 30 MIN: CPT | Performed by: INTERNAL MEDICINE

## 2024-06-19 PROCEDURE — 3075F SYST BP GE 130 - 139MM HG: CPT | Performed by: INTERNAL MEDICINE

## 2024-06-19 PROCEDURE — 3079F DIAST BP 80-89 MM HG: CPT | Performed by: INTERNAL MEDICINE

## 2024-06-19 PROCEDURE — 99211 OFF/OP EST MAY X REQ PHY/QHP: CPT | Performed by: INTERNAL MEDICINE

## 2024-06-19 RX ORDER — FERROUS SULFATE 325(65) MG
325 TABLET ORAL 2 TIMES DAILY
Qty: 180 TABLET | Refills: 1 | Status: SHIPPED | OUTPATIENT
Start: 2024-06-19 | End: 2024-12-16

## 2024-06-19 NOTE — TELEPHONE ENCOUNTER
Instructions   from Dr. Manolo Acuna MD    Return visit in 4 to 5 months with labs 1 week prior.  Patient will follow-up at [facility].    Pt to have labs 1 week prior to RV.  RV scheduled for [facility] 10/31/24 at 2:15 PM.

## 2024-06-19 NOTE — PROGRESS NOTES
using NASCET criteria. Automated exposure control, iterative reconstruction, and/or weight based adjustment of the mA/kV was utilized to reduce the radiation dose to as low as reasonably achievable. COMPARISON: None. HISTORY: ORDERING SYSTEM PROVIDED HISTORY: TIA FINDINGS: CTA NECK: AORTIC ARCH/ARCH VESSELS: No dissection or arterial injury.  No significant stenosis of the brachiocephalic or subclavian arteries. CAROTID ARTERIES: No dissection, arterial injury, or hemodynamically significant stenosis by NASCET criteria. VERTEBRAL ARTERIES: No dissection, arterial injury, or significant stenosis. SOFT TISSUES: The lung apices are clear.  No cervical or superior mediastinal lymphadenopathy.  The larynx and pharynx are unremarkable.  No acute abnormality of the salivary glands.  Multinodular thyroid gland including a dominant 1.5 cm nodule within the inferior left thyroid lobe. BONES: No acute osseous abnormality. CTA HEAD: ANTERIOR CIRCULATION: No significant stenosis of the intracranial internal carotid, anterior cerebral, or middle cerebral arteries. No aneurysm. POSTERIOR CIRCULATION: No significant stenosis of the vertebral, basilar, or posterior cerebral arteries. No aneurysm. OTHER: No dural venous sinus thrombosis on this non-dedicated study. BRAIN: No mass effect or midline shift. No extra-axial fluid collection. The gray-white differentiation is maintained.     No apparent arterial high grade stenosis, occlusion or aneurysm within the head or neck. Multinodular thyroid gland including a dominant 1.5 cm nodule within the inferior left thyroid lobe.  Nonemergent thyroid ultrasound recommended for further evaluation on an outpatient basis.     MRI BRAIN WO CONTRAST    Result Date: 6/24/2023  EXAMINATION: MRI OF THE BRAIN WITHOUT CONTRAST  6/24/2023 4:24 pm TECHNIQUE: Multiplanar multisequence MRI of the brain was performed without the administration of intravenous contrast. COMPARISON: None. HISTORY: ORDERING

## 2024-07-01 RX ORDER — MULTIVIT WITH MINERALS/LUTEIN
250 TABLET ORAL 2 TIMES DAILY
Qty: 180 TABLET | Refills: 1 | Status: SHIPPED | OUTPATIENT
Start: 2024-07-01

## 2024-07-29 RX ORDER — CLOPIDOGREL BISULFATE 75 MG/1
75 TABLET ORAL DAILY
Qty: 21 TABLET | Refills: 0 | Status: SHIPPED | OUTPATIENT
Start: 2024-07-29 | End: 2024-08-19

## 2024-08-15 NOTE — TELEPHONE ENCOUNTER
E-scribe requesting refill for Clopidogrel. Please review and e-scribe if applicable.     Last Visit Date: None, last filled 07/29/2024    Next Visit Date: 09/11/2024

## 2024-08-26 RX ORDER — CLOPIDOGREL BISULFATE 75 MG/1
75 TABLET ORAL DAILY
Qty: 21 TABLET | Refills: 0 | OUTPATIENT
Start: 2024-08-26 | End: 2024-09-16

## 2024-09-11 ENCOUNTER — OFFICE VISIT (OUTPATIENT)
Dept: NEUROLOGY | Age: 49
End: 2024-09-11
Payer: COMMERCIAL

## 2024-09-11 VITALS
HEART RATE: 68 BPM | SYSTOLIC BLOOD PRESSURE: 127 MMHG | WEIGHT: 161.6 LBS | DIASTOLIC BLOOD PRESSURE: 85 MMHG | BODY MASS INDEX: 27.74 KG/M2

## 2024-09-11 DIAGNOSIS — I63.9 ISCHEMIC STROKE (HCC): Primary | ICD-10-CM

## 2024-09-11 DIAGNOSIS — Z09 HOSPITAL DISCHARGE FOLLOW-UP: ICD-10-CM

## 2024-09-11 PROCEDURE — 3079F DIAST BP 80-89 MM HG: CPT

## 2024-09-11 PROCEDURE — 1036F TOBACCO NON-USER: CPT

## 2024-09-11 PROCEDURE — 1111F DSCHRG MED/CURRENT MED MERGE: CPT

## 2024-09-11 PROCEDURE — 99214 OFFICE O/P EST MOD 30 MIN: CPT

## 2024-09-11 PROCEDURE — 3074F SYST BP LT 130 MM HG: CPT

## 2024-09-11 PROCEDURE — G8427 DOCREV CUR MEDS BY ELIG CLIN: HCPCS

## 2024-09-11 PROCEDURE — G8419 CALC BMI OUT NRM PARAM NOF/U: HCPCS

## 2024-09-11 RX ORDER — ASPIRIN 81 MG/1
81 TABLET, CHEWABLE ORAL DAILY
Qty: 30 TABLET | Refills: 3 | Status: SHIPPED | OUTPATIENT
Start: 2024-09-11 | End: 2024-09-11 | Stop reason: ALTCHOICE

## 2024-10-08 ENCOUNTER — TELEPHONE (OUTPATIENT)
Dept: NEUROLOGY | Age: 49
End: 2024-10-08

## 2024-10-08 NOTE — TELEPHONE ENCOUNTER
Patient brought in paperwork that need filled out and faxed to place of employment  by 10/11/24. Paperwork was scanned into chart. Paperwork and information was given to MA

## 2024-10-30 DIAGNOSIS — E61.1 IRON DEFICIENCY: Primary | ICD-10-CM

## 2024-12-30 RX ORDER — MULTIVIT WITH MINERALS/LUTEIN
250 TABLET ORAL 2 TIMES DAILY
Qty: 180 TABLET | Refills: 1 | Status: SHIPPED | OUTPATIENT
Start: 2024-12-30

## 2025-01-15 ENCOUNTER — OFFICE VISIT (OUTPATIENT)
Dept: NEUROLOGY | Age: 50
End: 2025-01-15
Payer: OTHER GOVERNMENT

## 2025-01-15 VITALS
SYSTOLIC BLOOD PRESSURE: 130 MMHG | HEART RATE: 78 BPM | BODY MASS INDEX: 27.49 KG/M2 | DIASTOLIC BLOOD PRESSURE: 84 MMHG | HEIGHT: 64 IN | WEIGHT: 161 LBS

## 2025-01-15 DIAGNOSIS — R40.4 STARING EPISODES: Primary | ICD-10-CM

## 2025-01-15 DIAGNOSIS — G43.009 MIGRAINE WITHOUT AURA AND WITHOUT STATUS MIGRAINOSUS, NOT INTRACTABLE: ICD-10-CM

## 2025-01-15 DIAGNOSIS — I63.00 CEREBROVASCULAR ACCIDENT (CVA) DUE TO THROMBOSIS OF PRECEREBRAL ARTERY (HCC): ICD-10-CM

## 2025-01-15 DIAGNOSIS — F41.9 ANXIETY: ICD-10-CM

## 2025-01-15 PROCEDURE — 1036F TOBACCO NON-USER: CPT

## 2025-01-15 PROCEDURE — G8419 CALC BMI OUT NRM PARAM NOF/U: HCPCS

## 2025-01-15 PROCEDURE — 3079F DIAST BP 80-89 MM HG: CPT

## 2025-01-15 PROCEDURE — G8427 DOCREV CUR MEDS BY ELIG CLIN: HCPCS

## 2025-01-15 PROCEDURE — 3075F SYST BP GE 130 - 139MM HG: CPT

## 2025-01-15 PROCEDURE — 99214 OFFICE O/P EST MOD 30 MIN: CPT

## 2025-01-15 RX ORDER — DULOXETIN HYDROCHLORIDE 30 MG/1
30 CAPSULE, DELAYED RELEASE ORAL DAILY
COMMUNITY
Start: 2024-12-05 | End: 2025-01-15 | Stop reason: CLARIF

## 2025-01-15 RX ORDER — ESCITALOPRAM OXALATE 20 MG/1
TABLET ORAL
COMMUNITY
Start: 2025-01-02

## 2025-01-15 RX ORDER — HYDROXYZINE HYDROCHLORIDE 25 MG/1
25 TABLET, FILM COATED ORAL
COMMUNITY
Start: 2024-09-11 | End: 2025-01-15 | Stop reason: CLARIF

## 2025-01-15 RX ORDER — BUPROPION HYDROCHLORIDE 150 MG/1
TABLET ORAL
COMMUNITY
Start: 2024-11-18 | End: 2025-01-15 | Stop reason: CLARIF

## 2025-01-15 RX ORDER — PROPRANOLOL HCL 20 MG
20 TABLET ORAL 2 TIMES DAILY
Qty: 90 TABLET | Refills: 3 | Status: SHIPPED | OUTPATIENT
Start: 2025-01-15

## 2025-01-15 RX ORDER — LORAZEPAM 1 MG/1
1 TABLET ORAL DAILY PRN
COMMUNITY
Start: 2024-12-11

## 2025-01-15 RX ORDER — ASPIRIN 81 MG/1
81 TABLET, CHEWABLE ORAL DAILY
COMMUNITY
Start: 2024-08-18

## 2025-01-15 RX ORDER — DULOXETIN HYDROCHLORIDE 60 MG/1
60 CAPSULE, DELAYED RELEASE ORAL DAILY
COMMUNITY
Start: 2024-12-19

## 2025-01-15 ASSESSMENT — ENCOUNTER SYMPTOMS
TROUBLE SWALLOWING: 0
PHOTOPHOBIA: 0
VOICE CHANGE: 0
SHORTNESS OF BREATH: 0
NAUSEA: 0
CHEST TIGHTNESS: 0
COLOR CHANGE: 0
WHEEZING: 0
VOMITING: 0

## 2025-01-15 NOTE — PROGRESS NOTES
[address] [facility], [facility] [address]  [place] [address]  P: [phone]  F: [phone]    Neurology Attending Attestation     I have discussed the care of patient including pertinent history and exam findings, with the Neurology resident.      I have seen and examined the patient and the key elements of all parts of the encounter have been performed by me. I agree with the assessment, plan and orders as documented by the fellow/resident, after I modified exam findings and plan of treatments, and the final version is my approved version of the assessment.               NEUROLOGY CLINIC NOTE     PATIENT NAME: Lilia Portillo  PATIENT MRN: [id number]  PRIMARY CARE PHYSICIAN: No primary care provider on file.    HPI:      Lilia Portillo is a 49 y.o. female with past medical history of right parietal lobe infarction, hypertension noncompliant with medications,  presented to the neurology clinic for evaluation and follow-up regarding left temporoparietal infarction.      on 9/11/2024, she presents for follow-up.  She has been working with speech therapy, and is had improvement in her speech.  However, she still continues to have expressive and receptive aphasia.  She states she was unable to afford Xarelto, so she was started on Plavix for 21 days.  She completed the course, and is currently on aspirin 81 mg and Lipitor 80 mg. She has symptoms of dizziness and imbalance when standing up, and states she is having to hold on to things to prevent falling. She denies actually falling. No new stroke like symptoms.   In addition, she has been having increasing difficulty with comprehension, memory, and focus.       On recent hospital admission to [facility] in May 2024, patient started experiencing expressive aphasia on 5/22/2024 while at work.  She was seen at [facility], and underwent CT head without IV contrast and CTA which were unremarkable.  She had NIHSS 7.

## 2025-01-15 NOTE — PATIENT INSTRUCTIONS
Please continue taking aspirin and plavix  EEG for 1 hr   Stroke Neurology Consult   Psychiatry consult

## 2025-01-29 ENCOUNTER — TELEPHONE (OUTPATIENT)
Dept: NEUROLOGY | Age: 50
End: 2025-01-29

## 2025-01-29 NOTE — TELEPHONE ENCOUNTER
Patient and her mother in law came is stated that you and her discussed names of psychologist that you would recommend. They can not remember the names that were given. Please Advise

## 2025-03-14 RX ORDER — MULTIVIT WITH MINERALS/LUTEIN
250 TABLET ORAL 2 TIMES DAILY
Qty: 180 TABLET | Refills: 1 | Status: SHIPPED | OUTPATIENT
Start: 2025-03-14

## 2025-05-12 DIAGNOSIS — G43.009 MIGRAINE WITHOUT AURA AND WITHOUT STATUS MIGRAINOSUS, NOT INTRACTABLE: ICD-10-CM

## 2025-05-12 RX ORDER — PROPRANOLOL HCL 20 MG
20 TABLET ORAL 2 TIMES DAILY
Qty: 180 TABLET | Refills: 1 | Status: SHIPPED | OUTPATIENT
Start: 2025-05-12

## 2025-05-12 NOTE — TELEPHONE ENCOUNTER
Pt is requesting refill of:     Medication: Propranolol 20 mg     Active on Med List: Yes     Last Office Visit: 1/15/25    Next Office Visit: 7/15/25 w/ Dr Simons     Please refill is request is acceptable. Thank you

## 2025-07-15 ENCOUNTER — OFFICE VISIT (OUTPATIENT)
Dept: NEUROLOGY | Age: 50
End: 2025-07-15
Payer: OTHER GOVERNMENT

## 2025-07-15 VITALS
DIASTOLIC BLOOD PRESSURE: 104 MMHG | HEIGHT: 64 IN | SYSTOLIC BLOOD PRESSURE: 148 MMHG | WEIGHT: 193.1 LBS | BODY MASS INDEX: 32.97 KG/M2 | HEART RATE: 80 BPM

## 2025-07-15 DIAGNOSIS — G43.009 MIGRAINE WITHOUT AURA AND WITHOUT STATUS MIGRAINOSUS, NOT INTRACTABLE: ICD-10-CM

## 2025-07-15 DIAGNOSIS — R56.9 SEIZURE (HCC): Primary | ICD-10-CM

## 2025-07-15 DIAGNOSIS — I63.00 CEREBROVASCULAR ACCIDENT (CVA) DUE TO THROMBOSIS OF PRECEREBRAL ARTERY (HCC): ICD-10-CM

## 2025-07-15 DIAGNOSIS — R40.4 STARING EPISODES: ICD-10-CM

## 2025-07-15 PROCEDURE — 99214 OFFICE O/P EST MOD 30 MIN: CPT

## 2025-07-15 PROCEDURE — 3080F DIAST BP >= 90 MM HG: CPT

## 2025-07-15 PROCEDURE — 3077F SYST BP >= 140 MM HG: CPT

## 2025-07-15 RX ORDER — LEVETIRACETAM 500 MG/1
500 TABLET ORAL 2 TIMES DAILY
Qty: 60 TABLET | Refills: 3 | Status: SHIPPED | OUTPATIENT
Start: 2025-07-15

## 2025-07-15 NOTE — PROGRESS NOTES
Spouse name: Not on file    Number of children: Not on file    Years of education: Not on file    Highest education level: Not on file   Occupational History    Not on file   Tobacco Use    Smoking status: Former     Current packs/day: 0.00     Types: Cigarettes     Quit date: 2023     Years since quittin.0    Smokeless tobacco: Never   Vaping Use    Vaping status: Never Used   Substance and Sexual Activity    Alcohol use: Never    Drug use: Never    Sexual activity: Not on file   Other Topics Concern    Not on file   Social History Narrative    Not on file     Social Drivers of Health     Financial Resource Strain: Not on file   Food Insecurity: Not on file   Transportation Needs: Not on file   Physical Activity: Not on file   Stress: Not on file   Social Connections: Not on file   Intimate Partner Violence: Not on file   Housing Stability: Not on file        Current Outpatient Medications   Medication Sig Dispense Refill    Ascorbic Acid (VITAMIN C) 250 MG tablet TAKE 1 TABLET BY MOUTH TWICE A  tablet 1    aspirin 81 MG chewable tablet Take 1 tablet by mouth daily      escitalopram (LEXAPRO) 20 MG tablet TAKE 1 TABLET BY MOUTH EVERYDAY (Patient taking differently: Take 0.5 tablets by mouth daily)      DULoxetine (CYMBALTA) 60 MG extended release capsule Take 1 capsule by mouth daily      clopidogrel (PLAVIX) 75 MG tablet Take 1 tablet by mouth daily for 21 days 21 tablet 0    ferrous sulfate (IRON 325) 325 (65 Fe) MG tablet Take 1 tablet by mouth 2 times daily 180 tablet 1    amLODIPine (NORVASC) 5 MG tablet Take 1 tablet by mouth daily 30 tablet 11    atorvastatin (LIPITOR) 80 MG tablet Take 1 tablet by mouth nightly 30 tablet 0    levothyroxine (SYNTHROID) 50 MCG tablet Take 1 tablet by mouth Daily 30 tablet 0    lisinopril (PRINIVIL;ZESTRIL) 10 MG tablet Take by mouth daily      propranolol (INDERAL) 20 MG tablet TAKE 1 TABLET BY MOUTH TWICE A DAY (Patient not taking: Reported on 7/15/2025) 180

## 2025-07-29 ENCOUNTER — HOSPITAL ENCOUNTER (OUTPATIENT)
Dept: NEUROLOGY | Age: 50
Discharge: HOME | End: 2025-07-29
Payer: OTHER GOVERNMENT

## 2025-07-29 DIAGNOSIS — R56.9 SEIZURE (HCC): ICD-10-CM

## 2025-07-29 PROCEDURE — 95816 EEG AWAKE AND DROWSY: CPT | Performed by: PSYCHIATRY & NEUROLOGY

## 2025-07-29 PROCEDURE — 95819 EEG AWAKE AND ASLEEP: CPT

## 2025-07-29 NOTE — PROCEDURES
EEG REPORT       Patient: Lilia Portillo Age: 50 y.o.  MRN: [id number]      Referring Provider: Lacey Anthony DO    History: This routine 30 minute scalp EEG was recorded with video- monitoring for a 50 y.o.. female who presented with staring spell. This EEG was performed to evaluate for focal and epileptiform abnormalities.     Lilia Portillo   Current Outpatient Medications   Medication Sig Dispense Refill    levETIRAcetam (KEPPRA) 500 MG tablet Take 1 tablet by mouth 2 times daily 60 tablet 3    propranolol (INDERAL) 20 MG tablet TAKE 1 TABLET BY MOUTH TWICE A DAY (Patient not taking: Reported on 7/15/2025) 180 tablet 1    Ascorbic Acid (VITAMIN C) 250 MG tablet TAKE 1 TABLET BY MOUTH TWICE A  tablet 1    aspirin 81 MG chewable tablet Take 1 tablet by mouth daily      escitalopram (LEXAPRO) 20 MG tablet TAKE 1 TABLET BY MOUTH EVERYDAY (Patient taking differently: Take 0.5 tablets by mouth daily)      LORazepam (ATIVAN) 1 MG tablet Take 1 tablet by mouth daily as needed for Anxiety. (Patient not taking: Reported on 7/15/2025)      DULoxetine (CYMBALTA) 60 MG extended release capsule Take 1 capsule by mouth daily      apixaban (ELIQUIS) 5 MG TABS tablet Take 1 tablet by mouth 2 times daily (Patient not taking: Reported on 7/15/2025) 180 tablet 0    clopidogrel (PLAVIX) 75 MG tablet Take 1 tablet by mouth daily for 21 days 21 tablet 0    ferrous sulfate (IRON 325) 325 (65 Fe) MG tablet Take 1 tablet by mouth 2 times daily 180 tablet 1    amLODIPine (NORVASC) 5 MG tablet Take 1 tablet by mouth daily 30 tablet 11    atorvastatin (LIPITOR) 80 MG tablet Take 1 tablet by mouth nightly 30 tablet 0    levothyroxine (SYNTHROID) 50 MCG tablet Take 1 tablet by mouth Daily 30 tablet 0    lisinopril (PRINIVIL;ZESTRIL) 10 MG tablet Take by mouth daily       No current facility-administered medications for this encounter.       Technical Description: This is a 18 channel digital EEG recording with time-locked video.

## 2025-08-11 ENCOUNTER — TELEPHONE (OUTPATIENT)
Dept: NEUROLOGY | Age: 50
End: 2025-08-11